# Patient Record
Sex: FEMALE | Race: WHITE | NOT HISPANIC OR LATINO | Employment: FULL TIME | URBAN - NONMETROPOLITAN AREA
[De-identification: names, ages, dates, MRNs, and addresses within clinical notes are randomized per-mention and may not be internally consistent; named-entity substitution may affect disease eponyms.]

---

## 2021-03-01 ENCOUNTER — HOSPITAL ENCOUNTER (EMERGENCY)
Facility: HOSPITAL | Age: 56
Discharge: HOME | End: 2021-03-01
Attending: EMERGENCY MEDICINE | Admitting: EMERGENCY MEDICINE
Payer: COMMERCIAL

## 2021-03-01 ENCOUNTER — APPOINTMENT (EMERGENCY)
Dept: CT IMAGING | Facility: HOSPITAL | Age: 56
End: 2021-03-01
Payer: COMMERCIAL

## 2021-03-01 VITALS
WEIGHT: 209.44 LBS | DIASTOLIC BLOOD PRESSURE: 70 MMHG | TEMPERATURE: 98.8 F | HEIGHT: 64 IN | BODY MASS INDEX: 35.76 KG/M2 | SYSTOLIC BLOOD PRESSURE: 131 MMHG | OXYGEN SATURATION: 97 % | HEART RATE: 79 BPM | RESPIRATION RATE: 16 BRPM

## 2021-03-01 DIAGNOSIS — N20.1 RIGHT URETERAL STONE: Primary | ICD-10-CM

## 2021-03-01 DIAGNOSIS — M54.31 SCIATICA OF RIGHT SIDE: ICD-10-CM

## 2021-03-01 LAB
ANION GAP SERPL CALCULATED.3IONS-SCNC: 6 MMOL/L (ref 4–13)
BACTERIA UR QL AUTO: ABNORMAL /HPF
BASOPHILS # BLD AUTO: 0.05 THOUSANDS/ΜL (ref 0–0.1)
BASOPHILS NFR BLD AUTO: 1 % (ref 0–1)
BILIRUB UR QL STRIP: NEGATIVE
BUN SERPL-MCNC: 9 MG/DL (ref 5–25)
CALCIUM SERPL-MCNC: 8.7 MG/DL (ref 8.3–10.1)
CHLORIDE SERPL-SCNC: 103 MMOL/L (ref 100–108)
CLARITY UR: CLEAR
CO2 SERPL-SCNC: 30 MMOL/L (ref 21–32)
COLOR UR: YELLOW
CREAT SERPL-MCNC: 0.8 MG/DL (ref 0.6–1.3)
EOSINOPHIL # BLD AUTO: 0.17 THOUSAND/ΜL (ref 0–0.61)
EOSINOPHIL NFR BLD AUTO: 2 % (ref 0–6)
ERYTHROCYTE [DISTWIDTH] IN BLOOD BY AUTOMATED COUNT: 15.4 % (ref 11.6–15.1)
GFR SERPL CREATININE-BSD FRML MDRD: 83 ML/MIN/1.73SQ M
GLUCOSE SERPL-MCNC: 124 MG/DL (ref 65–140)
GLUCOSE UR STRIP-MCNC: NEGATIVE MG/DL
HCT VFR BLD AUTO: 41 % (ref 34.8–46.1)
HGB BLD-MCNC: 13.4 G/DL (ref 11.5–15.4)
HGB UR QL STRIP.AUTO: ABNORMAL
IMM GRANULOCYTES # BLD AUTO: 0.03 THOUSAND/UL (ref 0–0.2)
IMM GRANULOCYTES NFR BLD AUTO: 0 % (ref 0–2)
KETONES UR STRIP-MCNC: NEGATIVE MG/DL
LEUKOCYTE ESTERASE UR QL STRIP: NEGATIVE
LYMPHOCYTES # BLD AUTO: 3.52 THOUSANDS/ΜL (ref 0.6–4.47)
LYMPHOCYTES NFR BLD AUTO: 39 % (ref 14–44)
MCH RBC QN AUTO: 27.7 PG (ref 26.8–34.3)
MCHC RBC AUTO-ENTMCNC: 32.7 G/DL (ref 31.4–37.4)
MCV RBC AUTO: 85 FL (ref 82–98)
MONOCYTES # BLD AUTO: 0.41 THOUSAND/ΜL (ref 0.17–1.22)
MONOCYTES NFR BLD AUTO: 5 % (ref 4–12)
NEUTROPHILS # BLD AUTO: 4.93 THOUSANDS/ΜL (ref 1.85–7.62)
NEUTS SEG NFR BLD AUTO: 53 % (ref 43–75)
NITRITE UR QL STRIP: NEGATIVE
NON-SQ EPI CELLS URNS QL MICRO: ABNORMAL /HPF
NRBC BLD AUTO-RTO: 0 /100 WBCS
PH UR STRIP.AUTO: 6.5 [PH]
PLATELET # BLD AUTO: 319 THOUSANDS/UL (ref 149–390)
PMV BLD AUTO: 10.1 FL (ref 8.9–12.7)
POTASSIUM SERPL-SCNC: 3.4 MMOL/L (ref 3.5–5.3)
PROT UR STRIP-MCNC: NEGATIVE MG/DL
RBC # BLD AUTO: 4.84 MILLION/UL (ref 3.81–5.12)
RBC #/AREA URNS AUTO: ABNORMAL /HPF
SODIUM SERPL-SCNC: 139 MMOL/L (ref 136–145)
SP GR UR STRIP.AUTO: 1.01 (ref 1–1.03)
UROBILINOGEN UR QL STRIP.AUTO: 0.2 E.U./DL
WBC # BLD AUTO: 9.11 THOUSAND/UL (ref 4.31–10.16)
WBC #/AREA URNS AUTO: ABNORMAL /HPF

## 2021-03-01 PROCEDURE — 96375 TX/PRO/DX INJ NEW DRUG ADDON: CPT

## 2021-03-01 PROCEDURE — 80048 BASIC METABOLIC PNL TOTAL CA: CPT | Performed by: EMERGENCY MEDICINE

## 2021-03-01 PROCEDURE — 85025 COMPLETE CBC W/AUTO DIFF WBC: CPT | Performed by: EMERGENCY MEDICINE

## 2021-03-01 PROCEDURE — 81001 URINALYSIS AUTO W/SCOPE: CPT | Performed by: EMERGENCY MEDICINE

## 2021-03-01 PROCEDURE — 36415 COLL VENOUS BLD VENIPUNCTURE: CPT | Performed by: EMERGENCY MEDICINE

## 2021-03-01 PROCEDURE — 96374 THER/PROPH/DIAG INJ IV PUSH: CPT

## 2021-03-01 PROCEDURE — 99284 EMERGENCY DEPT VISIT MOD MDM: CPT | Performed by: EMERGENCY MEDICINE

## 2021-03-01 PROCEDURE — 74176 CT ABD & PELVIS W/O CONTRAST: CPT

## 2021-03-01 PROCEDURE — 99284 EMERGENCY DEPT VISIT MOD MDM: CPT

## 2021-03-01 PROCEDURE — G1004 CDSM NDSC: HCPCS

## 2021-03-01 RX ORDER — OXYCODONE HYDROCHLORIDE AND ACETAMINOPHEN 5; 325 MG/1; MG/1
1 TABLET ORAL EVERY 4 HOURS PRN
Qty: 15 TABLET | Refills: 0 | Status: SHIPPED | OUTPATIENT
Start: 2021-03-01

## 2021-03-01 RX ORDER — NAPROXEN 500 MG/1
500 TABLET ORAL 2 TIMES DAILY WITH MEALS
Qty: 30 TABLET | Refills: 0 | Status: SHIPPED | OUTPATIENT
Start: 2021-03-01

## 2021-03-01 RX ORDER — ATORVASTATIN CALCIUM 20 MG/1
20 TABLET, FILM COATED ORAL DAILY
COMMUNITY

## 2021-03-01 RX ORDER — DIAZEPAM 5 MG/ML
2.5 INJECTION, SOLUTION INTRAMUSCULAR; INTRAVENOUS ONCE
Status: COMPLETED | OUTPATIENT
Start: 2021-03-01 | End: 2021-03-01

## 2021-03-01 RX ORDER — KETOROLAC TROMETHAMINE 30 MG/ML
15 INJECTION, SOLUTION INTRAMUSCULAR; INTRAVENOUS ONCE
Status: COMPLETED | OUTPATIENT
Start: 2021-03-01 | End: 2021-03-01

## 2021-03-01 RX ORDER — OMEPRAZOLE 20 MG/1
20 CAPSULE, DELAYED RELEASE ORAL DAILY
COMMUNITY
Start: 2021-01-06

## 2021-03-01 RX ORDER — TAMSULOSIN HYDROCHLORIDE 0.4 MG/1
0.4 CAPSULE ORAL
Qty: 10 CAPSULE | Refills: 0 | Status: SHIPPED | OUTPATIENT
Start: 2021-03-01 | End: 2021-03-11

## 2021-03-01 RX ADMIN — KETOROLAC TROMETHAMINE 15 MG: 30 INJECTION, SOLUTION INTRAMUSCULAR; INTRAVENOUS at 18:16

## 2021-03-01 RX ADMIN — DIAZEPAM 2.5 MG: 10 INJECTION, SOLUTION INTRAMUSCULAR; INTRAVENOUS at 18:16

## 2021-03-01 NOTE — ED PROVIDER NOTES
History  Chief Complaint   Patient presents with    Back Pain     pt c/o left sided lower back pain starting 3 days ago with pain radiating down right leg this morning w/frequent urination  denies injury/trauma/travel/sob/fevers/cough     Patient states she woke up this morning with right lower back pain that radiates down the right leg  No loss of bladder or bowel functions  Does complain of dysuria and frequency and some left flank pain  No fevers or chills  No trauma  No rash  No history kidney stones  Nothing taken prior to arrival for symptoms  States the right low back pain gets worse with movement  History provided by:  Patient   used: No    Back Pain  Location:  Lumbar spine  Quality:  Aching  Radiates to: Right leg  Pain severity:  Mild  Pain is:  Same all the time  Onset quality:  Sudden  Duration: Started this morning  Timing:  Constant  Progression:  Unchanged  Chronicity:  New  Context: not jumping from heights, not occupational injury and not recent injury    Relieved by:  Nothing  Worsened by: Movement and bending  Ineffective treatments:  None tried  Associated symptoms: no abdominal pain, no abdominal swelling, no chest pain, no dysuria, no fever, no headaches, no leg pain, no perianal numbness and no tingling        Prior to Admission Medications   Prescriptions Last Dose Informant Patient Reported? Taking?   atorvastatin (LIPITOR) 20 mg tablet  Self Yes Yes   Sig: Take 20 mg by mouth daily   omeprazole (PriLOSEC) 20 mg delayed release capsule   Yes Yes   Sig: Take 20 mg by mouth daily   sertraline (ZOLOFT) 50 mg tablet   Yes No   Sig: Take by mouth daily      Facility-Administered Medications: None       History reviewed  No pertinent past medical history  Past Surgical History:   Procedure Laterality Date    AUGMENTATION BREAST       SECTION      x 2    CHOLECYSTECTOMY      HYSTERECTOMY      MULTIPLE TOOTH EXTRACTIONS         History reviewed  No pertinent family history  I have reviewed and agree with the history as documented  E-Cigarette/Vaping    E-Cigarette Use Never User      E-Cigarette/Vaping Substances     Social History     Tobacco Use    Smoking status: Current Every Day Smoker     Packs/day: 1 00     Types: Cigarettes    Smokeless tobacco: Never Used   Substance Use Topics    Alcohol use: Not Currently    Drug use: Never       Review of Systems   Constitutional: Negative for chills and fever  HENT: Negative for ear pain, hearing loss, sore throat, trouble swallowing and voice change  Eyes: Negative for pain and discharge  Respiratory: Negative for cough, shortness of breath and wheezing  Cardiovascular: Negative for chest pain and palpitations  Gastrointestinal: Negative for abdominal pain, blood in stool, constipation, diarrhea, nausea and vomiting  Genitourinary: Positive for frequency and urgency  Negative for dysuria, flank pain and hematuria  Musculoskeletal: Positive for back pain  Negative for joint swelling, neck pain and neck stiffness  Skin: Negative for rash and wound  Neurological: Negative for dizziness, tingling, seizures, syncope, facial asymmetry and headaches  Psychiatric/Behavioral: Negative for hallucinations, self-injury and suicidal ideas  All other systems reviewed and are negative  Physical Exam  Physical Exam  Vitals signs and nursing note reviewed  Constitutional:       General: She is not in acute distress  Appearance: She is well-developed  HENT:      Head: Normocephalic and atraumatic  Right Ear: External ear normal       Left Ear: External ear normal    Eyes:      Conjunctiva/sclera: Conjunctivae normal       Pupils: Pupils are equal, round, and reactive to light  Neck:      Musculoskeletal: Normal range of motion and neck supple  Cardiovascular:      Rate and Rhythm: Normal rate and regular rhythm  Heart sounds: Normal heart sounds  No murmur  Pulmonary:      Effort: Pulmonary effort is normal       Breath sounds: Normal breath sounds  No wheezing or rales  Abdominal:      General: Bowel sounds are normal  There is no distension  Palpations: Abdomen is soft  Tenderness: There is no abdominal tenderness  There is no guarding or rebound  Musculoskeletal: Normal range of motion  General: No deformity  Skin:     General: Skin is warm and dry  Findings: No rash  Neurological:      General: No focal deficit present  Mental Status: She is alert and oriented to person, place, and time  Cranial Nerves: No cranial nerve deficit     Psychiatric:         Behavior: Behavior normal          Vital Signs  ED Triage Vitals [03/01/21 1754]   Temperature Pulse Respirations Blood Pressure SpO2   98 8 °F (37 1 °C) 90 18 (!) 211/91 98 %      Temp Source Heart Rate Source Patient Position - Orthostatic VS BP Location FiO2 (%)   Temporal Monitor Lying Right arm --      Pain Score       8           Vitals:    03/01/21 1754 03/01/21 1900   BP: (!) 211/91 131/70   Pulse: 90 79   Patient Position - Orthostatic VS: Lying Lying         Visual Acuity      ED Medications  Medications   ketorolac (TORADOL) injection 15 mg (15 mg Intravenous Given 3/1/21 1816)   diazepam (VALIUM) injection 2 5 mg (2 5 mg Intravenous Given 3/1/21 1816)       Diagnostic Studies  Results Reviewed     Procedure Component Value Units Date/Time    Urine Microscopic [[id number]]  (Abnormal) Collected: 03/01/21 1819    Lab Status: Final result Specimen: Urine, Clean Catch Updated: 03/01/21 1837     RBC, UA 10-20 /hpf      WBC, UA 0-5 /hpf      Epithelial Cells Occasional /hpf      Bacteria, UA Occasional /hpf     UA w Reflex to Microscopic w Reflex to Culture [[id number]]  (Abnormal) Collected: 03/01/21 1819    Lab Status: Final result Specimen: Urine, Clean Catch Updated: 03/01/21 1836     Color, UA Yellow     Clarity, UA Clear     Specific Gravity, UA 1 010     pH, UA 6 5 Leukocytes, UA Negative     Nitrite, UA Negative     Protein, UA Negative mg/dl      Glucose, UA Negative mg/dl      Ketones, UA Negative mg/dl      Urobilinogen, UA 0 2 E U /dl      Bilirubin, UA Negative     Blood, UA Moderate    Basic metabolic panel [[id number]]  (Abnormal) Collected: 03/01/21 1818    Lab Status: Final result Specimen: Blood from Arm, Left Updated: 03/01/21 1834     Sodium 139 mmol/L      Potassium 3 4 mmol/L      Chloride 103 mmol/L      CO2 30 mmol/L      ANION GAP 6 mmol/L      BUN 9 mg/dL      Creatinine 0 80 mg/dL      Glucose 124 mg/dL      Calcium 8 7 mg/dL      eGFR 83 ml/min/1 73sq m     Narrative:      [place] guidelines for Chronic Kidney Disease (CKD):     Stage 1 with normal or high GFR (GFR > 90 mL/min/1 73 square meters)    Stage 2 Mild CKD (GFR = 60-89 mL/min/1 73 square meters)    Stage 3A Moderate CKD (GFR = 45-59 mL/min/1 73 square meters)    Stage 3B Moderate CKD (GFR = 30-44 mL/min/1 73 square meters)    Stage 4 Severe CKD (GFR = 15-29 mL/min/1 73 square meters)    Stage 5 End Stage CKD (GFR <15 mL/min/1 73 square meters)  Note: GFR calculation is accurate only with a steady state creatinine    CBC and differential [[id number]]  (Abnormal) Collected: 03/01/21 1818    Lab Status: Final result Specimen: Blood from Arm, Left Updated: 03/01/21 1824     WBC 9 11 Thousand/uL      RBC 4 84 Million/uL      Hemoglobin 13 4 g/dL      Hematocrit 41 0 %      MCV 85 fL      MCH 27 7 pg      MCHC 32 7 g/dL      RDW 15 4 %      MPV 10 1 fL      Platelets 414 Thousands/uL      nRBC 0 /100 WBCs      Neutrophils Relative 53 %      Immat GRANS % 0 %      Lymphocytes Relative 39 %      Monocytes Relative 5 %      Eosinophils Relative 2 %      Basophils Relative 1 %      Neutrophils Absolute 4 93 Thousands/µL      Immature Grans Absolute 0 03 Thousand/uL      Lymphocytes Absolute 3 52 Thousands/µL      Monocytes Absolute 0 41 Thousand/µL      Eosinophils Absolute 0 17 Thousand/µL      Basophils Absolute 0 05 Thousands/µL                  CT recon only lumbar spine   Final Result by Nelson Molina MD (03/01 1902)      No fracture or traumatic subluxation  Workstation performed: [id number]         CT renal stone study abdomen pelvis wo contrast   Final Result by Nelson Molina MD (03/01 1900)      2 mm proximal right ureteral calculus located just distal to the right UPJ not currently causing right hydroureter or hydronephrosis No other ureteral calculi  The study was marked in [facility] for immediate notification  Workstation performed: [id number]                    Procedures  Procedures         ED Course  ED Course as of Mar 01 1916   Mon Mar 01, 2021   [address] Patient seen  Symptoms have improved  Neurologic exam is nonfocal                                 SBIRT 22yo+      Most Recent Value   SBIRT (22 yo +)   In order to provide better care to our patients, we are screening all of our patients for alcohol and drug use  Would it be okay to ask you these screening questions? Yes Filed at: 03/01/2021 1825   Initial Alcohol Screen: US AUDIT-C    1  How often do you have a drink containing alcohol?  0 Filed at: 03/01/2021 1825   2  How many drinks containing alcohol do you have on a typical day you are drinking? 0 Filed at: 03/01/2021 1825   3a  Male UNDER 65: How often do you have five or more drinks on one occasion? 0 Filed at: 03/01/2021 1825   3b  FEMALE Any Age, or MALE 65+: How often do you have 4 or more drinks on one occassion? 0 Filed at: 03/01/2021 1825   Audit-C Score  0 Filed at: 03/01/2021 1825   SHERRI: How many times in the past year have you    Used an illegal drug or used a prescription medication for non-medical reasons? Never Filed at: 03/01/2021 1825                    MDM  Number of Diagnoses or Management Options  Diagnosis management comments: Patient with a 2 mm right proximal stone    Will try outpatient treatment  Talked with family and patient were agreement with plan of care  Disposition  Final diagnoses:   Right ureteral stone   Sciatica of right side     Time reflects when diagnosis was documented in both MDM as applicable and the Disposition within this note     Time User Action Codes Description Comment    3/1/2021  6:17 PM Tania [place] Add [M54 31] Sciatica of right side     3/1/2021  7:01 PM Vislindsey, Arjohn Doll Remove [G87 69] Sciatica of right side     3/1/2021  7:05 PM Visstarras Arrickla Doll Add [N20 1] Right ureteral stone     3/1/2021  7:05 PM Tania [place] Add [N26 82] Sciatica of right side       ED Disposition     ED Disposition Condition Date/Time Comment    Discharge Stable Mon Mar 1, 2021  7:05 PM Tiffani Joshiy discharge to home/self care              Follow-up Information     Follow up With Specialties Details Why Contact Info Additional [address], DO  Call in 2 days  [facility] [address] [place] [phone]       [address] [facility] and Pain [place] [facility] Call in 2 days  [address]  [facility] [phone]  [address] Pain [facility], [address]tr[address] A, [facility]    Annamarie Viramontes MD Sports Medicine Call in 2 days  [address]  [address] [facility],[address] [place] [address]       Ar Riley MD Urology   [address] [place] [address]  [phone]             Patient's Medications   Discharge Prescriptions    NAPROXEN (NAPROSYN) 500 MG TABLET    Take 1 tablet (500 mg total) by mouth 2 (two) times a day with meals       Start Date: 3/1/2021  End Date: --       Order Dose: 500 mg       Quantity: 30 tablet    Refills: 0    OXYCODONE-ACETAMINOPHEN (PERCOCET) 5-325 MG PER TABLET    Take 1 tablet by mouth every 4 (four) hours as needed for moderate pain for up to 15 dosesMax Daily Amount: 6 tablets       Start Date: 3/1/2021  End Date: --       Order Dose: 1 tablet       Quantity: 15 tablet    Refills: 0    TAMSULOSIN (FLOMAX) 0 4 MG    Take 1 capsule (0 4 mg total) by mouth daily with dinner for 10 days       Start Date: 3/1/2021  End Date: 3/11/2021       Order Dose: 0 4 mg       Quantity: 10 capsule    Refills: 0         PDMP Review     None          ED Provider  Electronically Signed by           Luz Maria Wagner MD  03/01/21 El Beatty MD  03/01/21 El Beatty MD  03/01/21 9447

## 2021-03-01 NOTE — LETTER
Eduardo Concepcion was seen and treated in our emergency department on 3/1/2021  Diagnosis:     Leslye Bryant  may return to work on return date  She may return on this date: 03/04/2021         If you have any questions or concerns, please don't hesitate to call        Miguelina Zamora MD    ______________________________           _______________          _______________  Hospital Representative                              Date                                Time

## 2021-03-18 ENCOUNTER — TELEPHONE (OUTPATIENT)
Dept: CARDIOLOGY CLINIC | Facility: CLINIC | Age: 56
End: 2021-03-18

## 2021-03-18 NOTE — TELEPHONE ENCOUNTER
This patient was one of two patients in the referral workqueue marked ASAP  Dr Aleksandra Meredith said I should send this to you  He asked if you could maybe double book somewhere the next time he is at [facility] which is 4/8/2021  Thank you  I will be sending another one also

## 2021-04-08 ENCOUNTER — OFFICE VISIT (OUTPATIENT)
Dept: UROLOGY | Facility: CLINIC | Age: 56
End: 2021-04-08
Payer: COMMERCIAL

## 2021-04-08 VITALS
HEART RATE: 84 BPM | HEIGHT: 64 IN | SYSTOLIC BLOOD PRESSURE: 120 MMHG | DIASTOLIC BLOOD PRESSURE: 70 MMHG | WEIGHT: 198 LBS | BODY MASS INDEX: 33.8 KG/M2

## 2021-04-08 DIAGNOSIS — N20.1 RIGHT URETERAL STONE: ICD-10-CM

## 2021-04-08 PROCEDURE — 99203 OFFICE O/P NEW LOW 30 MIN: CPT | Performed by: UROLOGY

## 2021-04-08 NOTE — LETTER
2021     Emily Goss MD  [address] [facility] [address]    Patient: Deloris Torres   YOB: 1965   Date of Visit: 2021       Dear Dr Mari Gutierrez: Thank you for referring Deloris Torres to me for evaluation  Below are my notes for this consultation  If you have questions, please do not hesitate to call me  I look forward to following your patient along with you  Sincerely,        Sherryle Alice, MD        CC: No Recipients  Sherryle Alice, MD  2021  9:46 AM  Sign when Signing Visit  [address] [facility]  [place] [phone]  www  [facility]  org      NAME: Deloris Torres  AGE: 54 y o  SEX: female  : 1965   MRN: [id number]    DATE: 2021  TIME: 9:42 AM    Assessment and Plan:    2 mm right UPJ stone, should pass on its own  She is still having occasional right flank pain but if this persists she will call me and we will get another scan  In the meantime I am confident that the stone will pass on its own so I do not think any further imaging is needed  With regards to her left flank pain, I see no renal or ureteral origin for this  This is most likely radiculopathy  She will follow up with me as needed  Discussed stone prevention such as drinking more fluids avoiding salt etc   With 1 solitary stone she does not need a metabolic workup  Chief Complaint   No chief complaint on file  History of Present Illness     New patient office visit:  44-year-old woman who was seen emergency department 3/1/2021 for right flank pain and was found have a 2 mm proximal right ureteral calculus just distal to the right UPJ causing no hydronephrosis  I have personally reviewed her CT scan and she has no other calculi  A very tiny stone can be seen right at the UPJ per my review  No other abnormalities  She has never had stones before    No previous urologic visits  It must be noted that she had sciatica on the right side during the time of her emergency room visit and could barely lift her leg  Currently she has left flank pain  She has occasional right flank pain  She says her urine smells bad and has an orange color occasionally  The following portions of the patient's history were reviewed and updated as appropriate: allergies, current medications, past family history, past medical history, past social history, past surgical history and problem list   History reviewed  No pertinent past medical history  Past Surgical History:   Procedure Laterality Date    AUGMENTATION BREAST       SECTION      x 2    CHOLECYSTECTOMY      HYSTERECTOMY      MULTIPLE TOOTH EXTRACTIONS       shoulder  Review of Systems   Review of Systems   Constitutional: Negative for fever  Respiratory: Negative for shortness of breath  Cardiovascular: Negative for chest pain  Genitourinary: Negative  Active Problem List   There is no problem list on file for this patient  Objective   /70   Pulse 84   Ht 5' 4" (1 626 m)   Wt 89 8 kg (198 lb)   BMI 33 99 kg/m²     Physical Exam  Constitutional:       Appearance: Normal appearance  HENT:      Head: Normocephalic and atraumatic  Eyes:      Extraocular Movements: Extraocular movements intact  Neck:      Musculoskeletal: Normal range of motion  Pulmonary:      Effort: Pulmonary effort is normal    Musculoskeletal: Normal range of motion  Skin:     Coloration: Skin is not jaundiced or pale  Neurological:      General: No focal deficit present  Mental Status: She is alert and oriented to person, place, and time  Psychiatric:         Mood and Affect: Mood normal          Behavior: Behavior normal          Thought Content:  Thought content normal          Judgment: Judgment normal              Current Medications     Current Outpatient Medications:     atorvastatin (LIPITOR) 20 mg tablet, Take 20 mg by mouth daily, Disp: , Rfl:     naproxen (NAPROSYN) 500 mg tablet, Take 1 tablet (500 mg total) by mouth 2 (two) times a day with meals, Disp: 30 tablet, Rfl: 0    omeprazole (PriLOSEC) 20 mg delayed release capsule, Take 20 mg by mouth daily, Disp: , Rfl:     sertraline (ZOLOFT) 50 mg tablet, Take 100 mg by mouth daily , Disp: , Rfl:     oxyCODONE-acetaminophen (PERCOCET) 5-325 mg per tablet, Take 1 tablet by mouth every 4 (four) hours as needed for moderate pain for up to 15 dosesMax Daily Amount: 6 tablets (Patient not taking: Reported on 4/8/2021), Disp: 15 tablet, Rfl: 0    tamsulosin (FLOMAX) 0 4 mg, Take 1 capsule (0 4 mg total) by mouth daily with dinner for 10 days (Patient not taking: Reported on 4/8/2021), Disp: 10 capsule, Rfl: 0        Mounika Bauer MD

## 2021-04-08 NOTE — PROGRESS NOTES
[address] [facility] for [facility]  [facility]  [address] [facility] [place]n  [place], [address] [place] [address]  [phone]  www  [facility]  org      NAME: Mikal Izaguirre  AGE: 54 y o  SEX: female  : 1965   MRN: [id number]    DATE: 2021  TIME: 9:42 AM    Assessment and Plan:    2 mm right UPJ stone, should pass on its own  She is still having occasional right flank pain but if this persists she will call me and we will get another scan  In the meantime I am confident that the stone will pass on its own so I do not think any further imaging is needed  With regards to her left flank pain, I see no renal or ureteral origin for this  This is most likely radiculopathy  She will follow up with me as needed  Discussed stone prevention such as drinking more fluids avoiding salt etc   With 1 solitary stone she does not need a metabolic workup  Chief Complaint   No chief complaint on file  History of Present Illness     New patient office visit:  72-year-old woman who was seen emergency department 3/1/2021 for right flank pain and was found have a 2 mm proximal right ureteral calculus just distal to the right UPJ causing no hydronephrosis  I have personally reviewed her CT scan and she has no other calculi  A very tiny stone can be seen right at the UPJ per my review  No other abnormalities  She has never had stones before  No previous urologic visits  It must be noted that she had sciatica on the right side during the time of her emergency room visit and could barely lift her leg  Currently she has left flank pain  She has occasional right flank pain  She says her urine smells bad and has an orange color occasionally        The following portions of the patient's history were reviewed and updated as appropriate: allergies, current medications, past family history, past medical history, past social history, past surgical history and problem list   History reviewed  No pertinent past medical history  Past Surgical History:   Procedure Laterality Date    AUGMENTATION BREAST       SECTION      x 2    CHOLECYSTECTOMY      HYSTERECTOMY      MULTIPLE TOOTH EXTRACTIONS       shoulder  Review of Systems   Review of Systems   Constitutional: Negative for fever  Respiratory: Negative for shortness of breath  Cardiovascular: Negative for chest pain  Genitourinary: Negative  Active Problem List   There is no problem list on file for this patient  Objective   /70   Pulse 84   Ht 5' 4" (1 626 m)   Wt 89 8 kg (198 lb)   BMI 33 99 kg/m²     Physical Exam  Constitutional:       Appearance: Normal appearance  HENT:      Head: Normocephalic and atraumatic  Eyes:      Extraocular Movements: Extraocular movements intact  Neck:      Musculoskeletal: Normal range of motion  Pulmonary:      Effort: Pulmonary effort is normal    Musculoskeletal: Normal range of motion  Skin:     Coloration: Skin is not jaundiced or pale  Neurological:      General: No focal deficit present  Mental Status: She is alert and oriented to person, place, and time  Psychiatric:         Mood and Affect: Mood normal          Behavior: Behavior normal          Thought Content:  Thought content normal          Judgment: Judgment normal              Current Medications     Current Outpatient Medications:     atorvastatin (LIPITOR) 20 mg tablet, Take 20 mg by mouth daily, Disp: , Rfl:     naproxen (NAPROSYN) 500 mg tablet, Take 1 tablet (500 mg total) by mouth 2 (two) times a day with meals, Disp: 30 tablet, Rfl: 0    omeprazole (PriLOSEC) 20 mg delayed release capsule, Take 20 mg by mouth daily, Disp: , Rfl:     sertraline (ZOLOFT) 50 mg tablet, Take 100 mg by mouth daily , Disp: , Rfl:     oxyCODONE-acetaminophen (PERCOCET) 5-325 mg per tablet, Take 1 tablet by mouth every 4 (four) hours as needed for moderate pain for up to 15 dosesMax Daily Amount: 6 tablets (Patient not taking: Reported on 4/8/2021), Disp: 15 tablet, Rfl: 0    tamsulosin (FLOMAX) 0 4 mg, Take 1 capsule (0 4 mg total) by mouth daily with dinner for 10 days (Patient not taking: Reported on 4/8/2021), Disp: 10 capsule, Rfl: 0        Citlaly Milton MD

## 2023-02-22 ENCOUNTER — HOSPITAL ENCOUNTER (INPATIENT)
Facility: HOSPITAL | Age: 58
LOS: 1 days | Discharge: ANOTHER HOSPITAL | End: 2023-02-23
Attending: EMERGENCY MEDICINE | Admitting: FAMILY MEDICINE

## 2023-02-22 ENCOUNTER — APPOINTMENT (EMERGENCY)
Dept: RADIOLOGY | Facility: HOSPITAL | Age: 58
End: 2023-02-22

## 2023-02-22 DIAGNOSIS — R07.9 CHEST PAIN: Primary | ICD-10-CM

## 2023-02-22 DIAGNOSIS — R07.9 CHEST PAIN, UNSPECIFIED TYPE: ICD-10-CM

## 2023-02-22 PROBLEM — F32.9 MDD (MAJOR DEPRESSIVE DISORDER): Status: ACTIVE | Noted: 2023-02-22

## 2023-02-22 LAB
2HR DELTA HS TROPONIN: 0 NG/L
4HR DELTA HS TROPONIN: 1 NG/L
ALBUMIN SERPL BCP-MCNC: 4.2 G/DL (ref 3.5–5)
ALP SERPL-CCNC: 88 U/L (ref 34–104)
ALT SERPL W P-5'-P-CCNC: 10 U/L (ref 7–52)
ANION GAP SERPL CALCULATED.3IONS-SCNC: 8 MMOL/L (ref 4–13)
AST SERPL W P-5'-P-CCNC: 16 U/L (ref 13–39)
ATRIAL RATE: 94 BPM
BASOPHILS # BLD AUTO: 0.05 THOUSANDS/ÂΜL (ref 0–0.1)
BASOPHILS NFR BLD AUTO: 1 % (ref 0–1)
BILIRUB SERPL-MCNC: 0.35 MG/DL (ref 0.2–1)
BUN SERPL-MCNC: 8 MG/DL (ref 5–25)
CALCIUM SERPL-MCNC: 9.2 MG/DL (ref 8.4–10.2)
CARDIAC TROPONIN I PNL SERPL HS: 3 NG/L
CARDIAC TROPONIN I PNL SERPL HS: 3 NG/L
CARDIAC TROPONIN I PNL SERPL HS: 4 NG/L
CHLORIDE SERPL-SCNC: 105 MMOL/L (ref 96–108)
CO2 SERPL-SCNC: 27 MMOL/L (ref 21–32)
CREAT SERPL-MCNC: 0.76 MG/DL (ref 0.6–1.3)
EOSINOPHIL # BLD AUTO: 0.22 THOUSAND/ÂΜL (ref 0–0.61)
EOSINOPHIL NFR BLD AUTO: 3 % (ref 0–6)
ERYTHROCYTE [DISTWIDTH] IN BLOOD BY AUTOMATED COUNT: 15.9 % (ref 11.6–15.1)
FLUAV RNA RESP QL NAA+PROBE: NEGATIVE
FLUBV RNA RESP QL NAA+PROBE: NEGATIVE
GFR SERPL CREATININE-BSD FRML MDRD: 87 ML/MIN/1.73SQ M
GLUCOSE SERPL-MCNC: 134 MG/DL (ref 65–140)
HCT VFR BLD AUTO: 43 % (ref 34.8–46.1)
HGB BLD-MCNC: 13.5 G/DL (ref 11.5–15.4)
IMM GRANULOCYTES # BLD AUTO: 0.04 THOUSAND/UL (ref 0–0.2)
IMM GRANULOCYTES NFR BLD AUTO: 1 % (ref 0–2)
LYMPHOCYTES # BLD AUTO: 2.86 THOUSANDS/ÂΜL (ref 0.6–4.47)
LYMPHOCYTES NFR BLD AUTO: 33 % (ref 14–44)
MCH RBC QN AUTO: 26.6 PG (ref 26.8–34.3)
MCHC RBC AUTO-ENTMCNC: 31.4 G/DL (ref 31.4–37.4)
MCV RBC AUTO: 85 FL (ref 82–98)
MONOCYTES # BLD AUTO: 0.52 THOUSAND/ÂΜL (ref 0.17–1.22)
MONOCYTES NFR BLD AUTO: 6 % (ref 4–12)
NEUTROPHILS # BLD AUTO: 4.94 THOUSANDS/ÂΜL (ref 1.85–7.62)
NEUTS SEG NFR BLD AUTO: 56 % (ref 43–75)
NRBC BLD AUTO-RTO: 0 /100 WBCS
P AXIS: 69 DEGREES
PLATELET # BLD AUTO: 293 THOUSANDS/UL (ref 149–390)
PMV BLD AUTO: 10.5 FL (ref 8.9–12.7)
POTASSIUM SERPL-SCNC: 3.7 MMOL/L (ref 3.5–5.3)
PR INTERVAL: 132 MS
PROT SERPL-MCNC: 7.3 G/DL (ref 6.4–8.4)
QRS AXIS: 75 DEGREES
QRSD INTERVAL: 96 MS
QT INTERVAL: 364 MS
QTC INTERVAL: 455 MS
RBC # BLD AUTO: 5.07 MILLION/UL (ref 3.81–5.12)
RSV RNA RESP QL NAA+PROBE: NEGATIVE
SARS-COV-2 RNA RESP QL NAA+PROBE: NEGATIVE
SODIUM SERPL-SCNC: 140 MMOL/L (ref 135–147)
T WAVE AXIS: 39 DEGREES
VENTRICULAR RATE: 94 BPM
WBC # BLD AUTO: 8.63 THOUSAND/UL (ref 4.31–10.16)

## 2023-02-22 RX ORDER — ASPIRIN 81 MG/1
81 TABLET, CHEWABLE ORAL DAILY
Status: DISCONTINUED | OUTPATIENT
Start: 2023-02-23 | End: 2023-02-23 | Stop reason: HOSPADM

## 2023-02-22 RX ORDER — ATORVASTATIN CALCIUM 20 MG/1
20 TABLET, FILM COATED ORAL
Status: DISCONTINUED | OUTPATIENT
Start: 2023-02-22 | End: 2023-02-23

## 2023-02-22 RX ORDER — SERTRALINE HYDROCHLORIDE 100 MG/1
100 TABLET, FILM COATED ORAL DAILY
Status: DISCONTINUED | OUTPATIENT
Start: 2023-02-22 | End: 2023-02-23 | Stop reason: HOSPADM

## 2023-02-22 RX ORDER — NITROGLYCERIN 0.4 MG/1
0.4 TABLET SUBLINGUAL
Status: DISCONTINUED | OUTPATIENT
Start: 2023-02-22 | End: 2023-02-23 | Stop reason: HOSPADM

## 2023-02-22 RX ORDER — ASPIRIN 81 MG/1
324 TABLET, CHEWABLE ORAL ONCE
Status: COMPLETED | OUTPATIENT
Start: 2023-02-22 | End: 2023-02-22

## 2023-02-22 RX ORDER — ACETAMINOPHEN 325 MG/1
650 TABLET ORAL EVERY 6 HOURS PRN
Status: DISCONTINUED | OUTPATIENT
Start: 2023-02-22 | End: 2023-02-23 | Stop reason: HOSPADM

## 2023-02-22 RX ADMIN — SERTRALINE 100 MG: 100 TABLET, FILM COATED ORAL at 14:12

## 2023-02-22 RX ADMIN — NITROGLYCERIN 0.4 MG: 0.4 TABLET SUBLINGUAL at 14:40

## 2023-02-22 RX ADMIN — NITROGLYCERIN 0.4 MG: 0.4 TABLET SUBLINGUAL at 09:11

## 2023-02-22 RX ADMIN — ATORVASTATIN CALCIUM 20 MG: 20 TABLET, FILM COATED ORAL at 16:21

## 2023-02-22 RX ADMIN — NITROGLYCERIN 0.4 MG: 0.4 TABLET SUBLINGUAL at 09:03

## 2023-02-22 RX ADMIN — ASPIRIN 81 MG 243 MG: 81 TABLET ORAL at 09:10

## 2023-02-22 RX ADMIN — NITROGLYCERIN 0.4 MG: 0.4 TABLET SUBLINGUAL at 09:21

## 2023-02-22 NOTE — H&P
[address]  1965, 62 y o  female MRN: [id number]  Unit/Bed#: -01 Encounter: [id number]  Primary Care Provider: Chato Martin DO   Date and time admitted to hospital: 2/22/2023  8:37 AM    * Chest pain  Assessment & Plan  · PHYLLIS 2  · Risk factors  · EKG nonspecific changes  · Troponin at 0 hours negative we will trend 2 more  · cardiology evaluated plan for stress test tomorrow treadmill nuclear  · Lipid panel  · Now cp free  · Tele  · As and lipitor started by cards  · cxr nml    MDD (major depressive disorder)  Assessment & Plan  · Restart zoloft    VTE Pharmacologic Prophylaxis: VTE Score: 1 Low Risk (Score 0-2) - Encourage Ambulation  Code Status: Level 1 - Full Code   Discussion with family: Updated  ( and daughter) at bedside  Anticipated Length of Stay: Patient will be admitted on an inpatient basis with an anticipated length of stay of greater than 2 midnights secondary to cp  Total Time Spent on Date of Encounter in care of patient: 65 minutes This time was spent on one or more of the following: performing physical exam; counseling and coordination of care; obtaining or reviewing history; documenting in the medical record; reviewing/ordering tests, medications or procedures; communicating with other healthcare professionals and discussing with patient's family/caregivers  Chief Complaint: Chest pain    History of Present Illness:  Rufino Woo is a 62 y o  female with a PMH of depression who presents with waxing and waning chest pain for about 3 days she stated started when she was walking around the house it actually went away after nitroglycerin given in the ER  Mild diaphoresis and shortness of breath and mild nausea the chest pain was described as pressure, risk factors for cholesterol elevation and smoking there is also family history of heart disease    Currently she is laying and eating and denies any shortness of breath there was some nonproductive cough for couple of days    Review of Systems:  Review of Systems   Constitutional: Positive for diaphoresis  Negative for chills and fever  HENT: Negative for ear pain and sore throat  Eyes: Negative for pain and visual disturbance  Respiratory: Positive for cough and shortness of breath  Cardiovascular: Positive for chest pain  Negative for palpitations  Gastrointestinal: Negative for abdominal pain and vomiting  Genitourinary: Negative for dysuria and hematuria  Musculoskeletal: Negative for arthralgias and back pain  Skin: Negative for color change and rash  Neurological: Negative for seizures and syncope  All other systems reviewed and are negative  Past Medical and Surgical History:   Past Medical History:   Diagnosis Date   • Hyperlipidemia        Past Surgical History:   Procedure Laterality Date   • AUGMENTATION BREAST     •  SECTION      x 2   • CHOLECYSTECTOMY     • HYSTERECTOMY     • MULTIPLE TOOTH EXTRACTIONS         Meds/Allergies:  Prior to Admission medications    Medication Sig Start Date End Date Taking?  Authorizing Provider   sertraline (ZOLOFT) 50 mg tablet Take 100 mg by mouth daily    Yes Historical Provider, MD   atorvastatin (LIPITOR) 20 mg tablet Take 20 mg by mouth daily  Patient not taking: Reported on 2023    Historical Provider, MD   omeprazole (PriLOSEC) 20 mg delayed release capsule Take 20 mg by mouth daily  Patient not taking: Reported on 2023   Historical Provider, MD   naproxen (NAPROSYN) 500 mg tablet Take 1 tablet (500 mg total) by mouth 2 (two) times a day with meals 3/1/21 2/22/23  Paradise Brady MD   oxyCODONE-acetaminophen (PERCOCET) 5-325 mg per tablet Take 1 tablet by mouth every 4 (four) hours as needed for moderate pain for up to 15 dosesMax Daily Amount: 6 tablets  Patient not taking: Reported on 2021 3/1/21 2/22/23  Paradise Brady MD   tamsulosin (FLOMAX) 0 4 mg Take 1 capsule (0 4 mg total) by mouth daily with dinner for 10 days  Patient not taking: Reported on 4/8/2021 3/1/21 2/22/23  Trina Romero MD     I have reviewed home medications with a medical source (PCP, Pharmacy, other)  Allergies: Allergies   Allergen Reactions   • Doxycycline Vomiting   • Cephalexin Other (See Comments) and Rash     Hot flashes and rash     • Morphine Rash and Vomiting   • Penicillins Rash       Social History:  Marital Status: /Civil Union   Substance Use History:   Social History     Substance and Sexual Activity   Alcohol Use Not Currently     Social History     Tobacco Use   Smoking Status Every Day   • Packs/day: 1 00   • Types: Cigarettes   Smokeless Tobacco Never     Social History     Substance and Sexual Activity   Drug Use Never       Family History:  Family History   Problem Relation Age of Onset   • Heart attack Father    • Stroke Mother    • Breast cancer Sister    • Diabetes Sister    • Diabetes Brother    • Cancer Brother        Physical Exam:     Vitals:   Blood Pressure: 126/71 (02/22/23 1126)  Pulse: 75 (02/22/23 1126)  Temperature: 98 1 °F (36 7 °C) (02/22/23 1126)  Temp Source: Temporal (02/22/23 1126)  Respirations: 18 (02/22/23 1126)  Height: 5' 4" (162 6 cm) (02/22/23 0843)  Weight - Scale: 89 5 kg (197 lb 5 oz) (02/22/23 0843)  SpO2: 95 % (02/22/23 1126)    Physical Exam  Vitals and nursing note reviewed  Constitutional:       General: She is not in acute distress  Appearance: She is well-developed  She is obese  HENT:      Head: Normocephalic and atraumatic  Eyes:      Conjunctiva/sclera: Conjunctivae normal    Cardiovascular:      Rate and Rhythm: Normal rate and regular rhythm  Heart sounds: No murmur heard  Pulmonary:      Effort: Pulmonary effort is normal  No respiratory distress  Breath sounds: Normal breath sounds  No wheezing or rales  Chest:      Chest wall: No tenderness     Abdominal:      General: There is no distension  Palpations: Abdomen is soft  Tenderness: There is no abdominal tenderness  Musculoskeletal:         General: No swelling  Cervical back: Neck supple  Skin:     General: Skin is warm and dry  Capillary Refill: Capillary refill takes less than 2 seconds  Neurological:      Mental Status: She is alert and oriented to person, place, and time  Psychiatric:         Mood and Affect: Mood normal          Additional Data:     Lab Results:  Results from last 7 days   Lab Units 02/22/23  0905   WBC Thousand/uL 8 63   HEMOGLOBIN g/dL 13 5   HEMATOCRIT % 43 0   PLATELETS Thousands/uL 293   NEUTROS PCT % 56   LYMPHS PCT % 33   MONOS PCT % 6   EOS PCT % 3     Results from last 7 days   Lab Units 02/22/23  0905   SODIUM mmol/L 140   POTASSIUM mmol/L 3 7   CHLORIDE mmol/L 105   CO2 mmol/L 27   BUN mg/dL 8   CREATININE mg/dL 0 76   ANION GAP mmol/L 8   CALCIUM mg/dL 9 2   ALBUMIN g/dL 4 2   TOTAL BILIRUBIN mg/dL 0 35   ALK PHOS U/L 88   ALT U/L 10   AST U/L 16   GLUCOSE RANDOM mg/dL 134                       Lines/Drains:  Invasive Devices     Peripheral Intravenous Line  Duration           Peripheral IV 02/22/23 Right Hand <1 day                    Imaging: Reviewed radiology reports from this admission including: chest xray  XR chest 1 view portable   Final Result by Marcelo Foley MD (02/22 3722)      No acute cardiopulmonary disease  Workstation performed: [id number]             EKG and Other Studies Reviewed on Admission:   · EKG: NSR  HR 94     ** Please Note: This note has been constructed using a voice recognition system   **

## 2023-02-22 NOTE — PROGRESS NOTES
Patient received nitro x1, chest pain #3 continued in L shoulder radiating to back  Dr Natalya Duran made aware and patient does not need further nitro treatment at this time  Patients BP prior to nitro was 162/74 and post nitro 125/67

## 2023-02-22 NOTE — CONSULTS
Consultation - Cardiology   Shahana German 62 y o  female MRN: [id number]  Unit/Bed#: ED 01 Encounter: [id number]    Assessment/Plan     Assessment:  Chest pain- constant over the last three days that is sometimes worse with exertion and also with rest   - ekg non specific changes   - troponin negative x 1  HLD with significantly elevated trig  Smoking  Fhx of CAD     Plan:  Continue asa 81 mg daily  Continue home dose of lipitor 20 mg daily   Monitor on telemetry  Trend troponins  Exercise nuc tomorrow for further ischemic evaluation given EKG changes     History of Present Illness   Physician Requesting Consult: Sagrario Way MD  Reason for Consult / Principal Problem: chest pain  HPI: Shahana German is a 62y o  year old female with history of HLD is here for concerns with chest pain  She states this has been ongoing for about 3 days  She states it started when she was walking around her house but is constant  She states it is waxing and waning throughout the day but has never completely gone away  At its worse, her pain is a 5/10  She states it is not associated with positional changes  She states when she is exerting herself she has to stop to catch her breath and then gets better  She did develop a cough a few days ago as well  She is a smoker 3/4 pack a day for 40 years  She does have a history of HLD  She reports her father had a massive heart attack when he was 61  Consults    Review of Systems   Constitutional: Negative for chills, fatigue and unexpected weight change  Respiratory: Positive for cough, chest tightness and shortness of breath  Cardiovascular: Positive for chest pain  Negative for palpitations and leg swelling  Gastrointestinal: Negative for nausea  Musculoskeletal: Positive for myalgias  Skin: Negative for color change, pallor and rash  Neurological: Negative for dizziness, weakness and light-headedness     Psychiatric/Behavioral: Negative for agitation, behavioral problems and confusion  Historical Information   History reviewed  No pertinent past medical history  Past Surgical History:   Procedure Laterality Date   • AUGMENTATION BREAST     •  SECTION      x 2   • CHOLECYSTECTOMY     • HYSTERECTOMY     • MULTIPLE TOOTH EXTRACTIONS       Social History     Substance and Sexual Activity   Alcohol Use Not Currently     Social History     Substance and Sexual Activity   Drug Use Never     E-Cigarette/Vaping   • E-Cigarette Use Never User      E-Cigarette/Vaping Substances     Social History     Tobacco Use   Smoking Status Every Day   • Packs/day: 1 00   • Types: Cigarettes   Smokeless Tobacco Never     Family History: non-contributory    Meds/Allergies   all current active meds have been reviewed  Allergies   Allergen Reactions   • Doxycycline Vomiting   • Cephalexin Other (See Comments) and Rash     Hot flashes and rash     • Morphine Rash and Vomiting   • Penicillins Rash       Objective   Vitals: Blood pressure 112/58, pulse 77, temperature 97 7 °F (36 5 °C), temperature source Temporal, resp  rate 21, height 5' 4" (1 626 m), weight 89 5 kg (197 lb 5 oz), SpO2 94 %  Orthostatic Blood Pressures    Flowsheet Row Most Recent Value   Blood Pressure 112/58 filed at 2023 1000   Patient Position - Orthostatic VS Lying filed at 2023 0843          No intake or output data in the 24 hours ending 23 1038    Invasive Devices     Peripheral Intravenous Line  Duration           Peripheral IV 23 Right Hand <1 day                Physical Exam  Vitals and nursing note reviewed  Constitutional:       Appearance: Normal appearance  HENT:      Head: Normocephalic and atraumatic  Cardiovascular:      Rate and Rhythm: Normal rate  Heart sounds: No murmur heard  No gallop  Pulmonary:      Effort: Pulmonary effort is normal       Breath sounds: No wheezing  Abdominal:      Palpations: Abdomen is soft     Musculoskeletal: General: No swelling  Cervical back: Neck supple  Skin:     General: Skin is warm  Capillary Refill: Capillary refill takes less than 2 seconds  Neurological:      General: No focal deficit present  Mental Status: She is alert and oriented to person, place, and time  Psychiatric:         Mood and Affect: Mood normal          Thought Content: Thought content normal          Lab Results:   I have personally reviewed pertinent lab results  CBC with diff:   Results from last 7 days   Lab Units 02/22/23  0905   WBC Thousand/uL 8 63   RBC Million/uL 5 07   HEMOGLOBIN g/dL 13 5   HEMATOCRIT % 43 0   MCV fL 85   MCH pg 26 6*   MCHC g/dL 31 4   RDW % 15 9*   MPV fL 10 5   PLATELETS Thousands/uL 293     CMP:   Results from last 7 days   Lab Units 02/22/23  0905   SODIUM mmol/L 140   CHLORIDE mmol/L 105   CO2 mmol/L 27   BUN mg/dL 8   CREATININE mg/dL 0 76   CALCIUM mg/dL 9 2   AST U/L 16   ALT U/L 10   ALK PHOS U/L 88   EGFR ml/min/1 73sq m 87     HS Troponin:   0   Lab Value Date/Time    HSTNI0 3 02/22/2023 0905     BNP:   Results from last 7 days   Lab Units 02/22/23  0905   POTASSIUM mmol/L 3 7   CHLORIDE mmol/L 105   CO2 mmol/L 27   BUN mg/dL 8   CREATININE mg/dL 0 76   CALCIUM mg/dL 9 2   EGFR ml/min/1 73sq m 87     Coags:     TSH:     Magnesium:     Lipid Profile:     Imaging: I have personally reviewed pertinent reports      EKG: Sinus rhythm with occasional Premature ventricular complexes  Nonspecific ST abnormality  Abnormal ECG  No previous ECGs available  Confirmed by Qi Oh ([id number]) on 2/22/2023 9:29:54 AM

## 2023-02-22 NOTE — ED PROVIDER NOTES
History  Chief Complaint   Patient presents with   • Chest Pain     Pt c/o upper left chest pain radiating to neck and left shoulder w/weakness, diarrhea and sob upon ambulating for past 3 days  Started with cough today  Pt took 81mg asa this morning  Denies travel/fevers/n/v     42-year-old female to the emergency department with chief complaint of intermittent chest pressure the last 3 days lasting for episodes of about 30 minutes each  Patient reports that it comes on with activity and gets relieved by rest   Associated with some mild diaphoresis and some shortness of breath and some mild nausea  Discomfort feels like a pressure and radiates into her neck and around her left shoulder and into her left arm      History provided by:  Relative and patient (Daughter provides portions of the history)  Chest Pain  Pain location:  Substernal area  Pain quality: pressure    Pain radiates to:  Neck, L shoulder and L arm  Pain radiates to the back: no    Pain severity:  Moderate  Onset quality:  Gradual  Duration:  3 days  Timing:  Intermittent  Progression:  Waxing and waning  Chronicity:  New  Relieved by:  Rest  Worsened by:  Nothing tried  Ineffective treatments:  None tried  Associated symptoms: cough, diaphoresis, nausea and shortness of breath    Associated symptoms: no abdominal pain, no back pain, no dizziness, no fatigue, no fever, no headache, no palpitations, not vomiting and no weakness    Risk factors: high cholesterol and smoking    Risk factors: no coronary artery disease, no diabetes mellitus and no hypertension    Risk factors comment:  Family hx of CAD (father)      Prior to Admission Medications   Prescriptions Last Dose Informant Patient Reported?  Taking?   atorvastatin (LIPITOR) 20 mg tablet   Yes Yes   Sig: Take 20 mg by mouth daily   omeprazole (PriLOSEC) 20 mg delayed release capsule 2/21/2023  Yes Yes   Sig: Take 20 mg by mouth daily   sertraline (ZOLOFT) 50 mg tablet 2/21/2023  Yes Yes   Sig: Take 100 mg by mouth daily       Facility-Administered Medications: None       History reviewed  No pertinent past medical history  Past Surgical History:   Procedure Laterality Date   • AUGMENTATION BREAST     •  SECTION      x 2   • CHOLECYSTECTOMY     • HYSTERECTOMY     • MULTIPLE TOOTH EXTRACTIONS         Family History   Problem Relation Age of Onset   • Heart attack Father    • Stroke Mother    • Breast cancer Sister    • Diabetes Sister    • Diabetes Brother    • Cancer Brother      I have reviewed and agree with the history as documented  E-Cigarette/Vaping   • E-Cigarette Use Never User      E-Cigarette/Vaping Substances     Social History     Tobacco Use   • Smoking status: Every Day     Packs/day:  00     Types: Cigarettes   • Smokeless tobacco: Never   Vaping Use   • Vaping Use: Never used   Substance Use Topics   • Alcohol use: Not Currently   • Drug use: Never       Review of Systems   Constitutional: Positive for diaphoresis  Negative for activity change, fatigue and fever  HENT: Negative  Negative for congestion, ear pain, rhinorrhea, sinus pressure and sore throat  Eyes: Negative  Respiratory: Positive for cough and shortness of breath  Negative for chest tightness and wheezing  Cardiovascular: Positive for chest pain  Negative for palpitations and leg swelling  Gastrointestinal: Positive for nausea  Negative for abdominal pain, diarrhea and vomiting  Endocrine: Negative  Genitourinary: Negative  Negative for dysuria, flank pain and frequency  Musculoskeletal: Positive for myalgias  Negative for arthralgias and back pain  Skin: Negative  Negative for rash  Allergic/Immunologic: Negative  Neurological: Negative for dizziness, weakness, light-headedness and headaches  Hematological: Negative  Psychiatric/Behavioral: Negative  All other systems reviewed and are negative  Physical Exam  Physical Exam  Vitals and nursing note reviewed  Constitutional:       General: She is awake  She is in acute distress  Appearance: Normal appearance  She is well-developed  She is obese  She is not ill-appearing or toxic-appearing  HENT:      Head: Normocephalic and atraumatic  Right Ear: External ear normal       Left Ear: External ear normal       Nose: Nose normal       Mouth/Throat:      Mouth: Mucous membranes are moist    Eyes:      General: Lids are normal  No scleral icterus  Extraocular Movements: Extraocular movements intact  Pupils: Pupils are equal, round, and reactive to light  Cardiovascular:      Rate and Rhythm: Normal rate and regular rhythm  Frequent extrasystoles are present  Heart sounds: Normal heart sounds  No murmur heard  Pulmonary:      Effort: Pulmonary effort is normal  No respiratory distress  Breath sounds: Normal breath sounds  No wheezing, rhonchi or rales  Abdominal:      General: Abdomen is flat  There is no distension  Palpations: Abdomen is soft  Tenderness: There is no abdominal tenderness  There is no guarding or rebound  Musculoskeletal:         General: No swelling, tenderness or deformity  Normal range of motion  Cervical back: Normal range of motion and neck supple  Skin:     General: Skin is warm and dry  Coloration: Skin is not jaundiced or pale  Findings: No rash  Neurological:      Mental Status: She is alert and oriented to person, place, and time  Mental status is at baseline  Cranial Nerves: No cranial nerve deficit  Motor: No weakness     Psychiatric:         Attention and Perception: Attention normal          Mood and Affect: Mood normal          Speech: Speech normal          Behavior: Behavior normal          Vital Signs  ED Triage Vitals [02/22/23 0843]   Temperature Pulse Respirations Blood Pressure SpO2   97 7 °F (36 5 °C) 87 18 (!) 179/84 99 %      Temp Source Heart Rate Source Patient Position - Orthostatic VS BP Location FiO2 (%) Temporal Monitor Lying Left arm --      Pain Score       7           Vitals:    02/22/23 0912 02/22/23 0915 02/22/23 0922 02/22/23 1000   BP: 129/61 119/58 119/58 112/58   Pulse: 92 97 92 77   Patient Position - Orthostatic VS:             Visual Acuity      ED Medications  Medications   nitroglycerin (NITROSTAT) SL tablet 0 4 mg (0 4 mg Sublingual Given 2/22/23 9600)   aspirin chewable tablet 324 mg (243 mg Oral Given 2/22/23 0910)       Diagnostic Studies  Results Reviewed     Procedure Component Value Units Date/Time    FLU/RSV/COVID - if FLU/RSV clinically relevant [[id number]]  (Normal) Collected: 02/22/23 0905    Lab Status: Final result Specimen: Nares from Nose Updated: 02/22/23 0950     SARS-CoV-2 Negative     INFLUENZA A PCR Negative     INFLUENZA B PCR Negative     RSV PCR Negative    Narrative:      FOR PEDIATRIC PATIENTS - copy/paste COVID Guidelines URL to browser: [email or web address]  [email or web address]    SARS-CoV-2 assay is a Nucleic Acid Amplification assay intended for the  qualitative detection of nucleic acid from SARS-CoV-2 in nasopharyngeal  swabs  Results are for the presumptive identification of SARS-CoV-2 RNA  Positive results are indicative of infection with SARS-CoV-2, the virus  causing COVID-19, but do not rule out bacterial infection or co-infection  with other viruses  Laboratories within the [place] and its  territories are required to report all positive results to the appropriate  public health authorities  Negative results do not preclude SARS-CoV-2  infection and should not be used as the sole basis for treatment or other  patient management decisions  Negative results must be combined with  clinical observations, patient history, and epidemiological information  This test has not been FDA cleared or approved  This test has been authorized by FDA under an Emergency Use Authorization  (EUA)   This test is only authorized for the duration of time the  declaration that circumstances exist justifying the authorization of the  emergency use of an in vitro diagnostic tests for detection of SARS-CoV-2  virus and/or diagnosis of COVID-19 infection under section 564(b)(1) of  the Act, 21 U  S C  012WXX-5(M)(0), unless the authorization is terminated  or revoked sooner  The test has been validated but independent review by FDA  and CLIA is pending  Test performed using [employer] Gene[employer]pert: This RT-PCR assay targets N2,  a region unique to SARS-CoV-2  A conserved region in the E-gene was chosen  for pan-Sarbecovirus detection which includes SARS-CoV-2  According to CMS-2020-01-R, this platform meets the definition of high-throughput technology      Comprehensive metabolic panel [[id number]] Collected: 02/22/23 0905    Lab Status: Final result Specimen: Blood from Line, Venous Updated: 02/22/23 0946     Sodium 140 mmol/L      Potassium 3 7 mmol/L      Chloride 105 mmol/L      CO2 27 mmol/L      ANION GAP 8 mmol/L      BUN 8 mg/dL      Creatinine 0 76 mg/dL      Glucose 134 mg/dL      Calcium 9 2 mg/dL      AST 16 U/L      ALT 10 U/L      Alkaline Phosphatase 88 U/L      Total Protein 7 3 g/dL      Albumin 4 2 g/dL      Total Bilirubin 0 35 mg/dL      eGFR 87 ml/min/1 73sq m     Narrative:      [facility]uthie guidelines for Chronic Kidney Disease (CKD):   •  Stage 1 with normal or high GFR (GFR > 90 mL/min/1 73 square meters)  •  Stage 2 Mild CKD (GFR = 60-89 mL/min/1 73 square meters)  •  Stage 3A Moderate CKD (GFR = 45-59 mL/min/1 73 square meters)  •  Stage 3B Moderate CKD (GFR = 30-44 mL/min/1 73 square meters)  •  Stage 4 Severe CKD (GFR = 15-29 mL/min/1 73 square meters)  •  Stage 5 End Stage CKD (GFR <15 mL/min/1 73 square meters)  Note: GFR calculation is accurate only with a steady state creatinine    HS Troponin I 2hr [[id number]]     Lab Status: No result Specimen: Blood     HS Troponin 0hr (reflex protocol) [[id number]] (Normal) Collected: 02/22/23 0905    Lab Status: Final result Specimen: Blood from Line, Venous Updated: 02/22/23 0935     hs TnI 0hr 3 ng/L     CBC and differential [[id number]]  (Abnormal) Collected: 02/22/23 0905    Lab Status: Final result Specimen: Blood from Line, Venous Updated: 02/22/23 0913     WBC 8 63 Thousand/uL      RBC 5 07 Million/uL      Hemoglobin 13 5 g/dL      Hematocrit 43 0 %      MCV 85 fL      MCH 26 6 pg      MCHC 31 4 g/dL      RDW 15 9 %      MPV 10 5 fL      Platelets 955 Thousands/uL      nRBC 0 /100 WBCs      Neutrophils Relative 56 %      Immat GRANS % 1 %      Lymphocytes Relative 33 %      Monocytes Relative 6 %      Eosinophils Relative 3 %      Basophils Relative 1 %      Neutrophils Absolute 4 94 Thousands/µL      Immature Grans Absolute 0 04 Thousand/uL      Lymphocytes Absolute 2 86 Thousands/µL      Monocytes Absolute 0 52 Thousand/µL      Eosinophils Absolute 0 22 Thousand/µL      Basophils Absolute 0 05 Thousands/µL                  XR chest 1 view portable   Final Result by Tiffany Mckeon MD (02/22 7817)      No acute cardiopulmonary disease                    Workstation performed: [id number]                    Procedures  ECG 12 Lead Documentation Only    Date/Time: 2/22/2023 8:57 AM  Performed by: Bi Crowley DO  Authorized by: Bi Crowley DO     ECG reviewed by me, the ED Provider: yes    Patient location:  ED  Previous ECG:     Previous ECG:  Unavailable  Interpretation:     Interpretation: normal    Rate:     ECG rate assessment: normal    Rhythm:     Rhythm: sinus rhythm    Ectopy:     Ectopy: PVCs    QRS:     QRS axis:  Normal  Conduction:     Conduction: normal    ST segments:     ST segments:  Non-specific  T waves:     T waves: non-specific               ED Course  ED Course as of 02/22/23 1012   Wed Feb 22, 2023   0938 Chest x-ray negative   0948 Patient reports relief of pain  Patient and daughter updated   1010 Discussed with medicine and patient will be admitted to that service                               SBIRT 20yo+    Eduard Brennan Most Recent Value   SBIRT (25 yo +)    In order to provide better care to our patients, we are screening all of our patients for alcohol and drug use  Would it be okay to ask you these screening questions? Yes Filed at: 02/22/2023 0700   Initial Alcohol Screen: US AUDIT-C     1  How often do you have a drink containing alcohol? 0 Filed at: 02/22/2023 0915   2  How many drinks containing alcohol do you have on a typical day you are drinking? 0 Filed at: 02/22/2023 0915   3a  Male UNDER 65: How often do you have five or more drinks on one occasion? 0 Filed at: 02/22/2023 0915   3b  FEMALE Any Age, or MALE 65+: How often do you have 4 or more drinks on one occassion? 0 Filed at: 02/22/2023 0915   Audit-C Score 0 Filed at: 02/22/2023 3438   SHERRI: How many times in the past year have you    Used an illegal drug or used a prescription medication for non-medical reasons? Never Filed at: 02/22/2023 0760                    Medical Decision Making  Patient presented to the emergency department and a MSE was performed  The patient was evaluated for complaint related to acute chest pain  Differential diagnoses included, but are not limited to, acute coronary syndrome, arrhythmia, myocardial infarction, pulmonary embolism, pneumothorax, infectious process of the lungs such as pneumonia, GERD, esophagitis, muscle strain, or costochondritis  Several of these diagnoses have been evaluated and ruled out by history and physical   As needed, patient will be further evaluated with laboratory and imaging studies  Higher level diagnostics, such as CT imaging or ultrasound, may also be required  Please see work-up portion of the note for further evaluation of patient's risk  Socioeconomic factors were also considered as part of the decision-making process    Unless otherwise stated in the chart or patient is admitted as elsewhere documented, any deviously prescribed medications will be maintained  Chest pain, unspecified type: complicated acute illness or injury with systemic symptoms  Amount and/or Complexity of Data Reviewed  Independent Historian:      Details: Daughter provided additional hx  Labs: ordered  Decision-making details documented in ED Course  Radiology: ordered  Decision-making details documented in ED Course  ECG/medicine tests: ordered and independent interpretation performed  Decision-making details documented in ED Course  Discussion of management or test interpretation with external provider(s): Patient presented to the emergency department and a MSE was performed  The patient was evaluated and diagnosed with acute chest pain  This is a new issue that will require additional planned work-up and treatment in a hospitalized setting  As may have been required as part of this evaluation, clinical laboratory test, radiology imaging and medical testing (I e  EKG) were ordered as necessitated by the patient's presentation  I independently reviewed these studies, imaging and testing  This patient's case is considered to be a considerable risk secondary to the above listed disease process and poses a threat to the patient's well-being and baseline function  Further in-patient diagnostic testing and management, which may include the administration of parenteral medications, is required  Risk  OTC drugs  Prescription drug management  Decision regarding hospitalization            Disposition  Final diagnoses:   Chest pain, unspecified type     Time reflects when diagnosis was documented in both MDM as applicable and the Disposition within this note     Time User Action Codes Description Comment    2/22/2023 10:08 AM Dolores Weston Add [R07 9] Chest pain     2/22/2023 10:11 AM Joy Mack Add [R07 9] Chest pain, unspecified type       ED Disposition     ED Disposition   Admit    Condition   Stable    Date/Time Wed Feb 22, 2023 10:11 AM    Comment              Follow-up Information    None         Patient's Medications   Discharge Prescriptions    No medications on file       No discharge procedures on file      PDMP Review     None          ED Provider  Electronically Signed by           Mike Leggett DO  02/22/23 1012

## 2023-02-22 NOTE — PLAN OF CARE

## 2023-02-22 NOTE — ASSESSMENT & PLAN NOTE
· PHYLLIS 2  · Risk factors  · EKG nonspecific changes  · Troponin at 0 hours negative we will trend 2 more  · cardiology evaluated plan for stress test tomorrow treadmill nuclear  · Lipid panel  · Now cp free  · Tele  · As and lipitor started by cards  · cxr nml

## 2023-02-22 NOTE — PROGRESS NOTES
Patient continues with #3 Chest pain to Left shoulder radiating to back  Patient states after she received nitro prior pain got better but did not go away  Dr Miguel Mooney aware and requested patient receive nitro prn  EKG was tiger texted to Dr Miguel Mooney

## 2023-02-23 ENCOUNTER — APPOINTMENT (OUTPATIENT)
Dept: NUCLEAR MEDICINE | Facility: HOSPITAL | Age: 58
End: 2023-02-23

## 2023-02-23 ENCOUNTER — APPOINTMENT (INPATIENT)
Dept: NON INVASIVE DIAGNOSTICS | Facility: HOSPITAL | Age: 58
End: 2023-02-23

## 2023-02-23 VITALS
BODY MASS INDEX: 33.69 KG/M2 | HEIGHT: 64 IN | SYSTOLIC BLOOD PRESSURE: 126 MMHG | TEMPERATURE: 98.6 F | WEIGHT: 197.31 LBS | HEART RATE: 66 BPM | OXYGEN SATURATION: 96 % | DIASTOLIC BLOOD PRESSURE: 68 MMHG | RESPIRATION RATE: 20 BRPM

## 2023-02-23 PROBLEM — E78.5 HLD (HYPERLIPIDEMIA): Status: ACTIVE | Noted: 2023-02-23

## 2023-02-23 LAB
ATRIAL RATE: 76 BPM
ATRIAL RATE: 77 BPM
CHEST PAIN STATEMENT: NORMAL
CHOLEST SERPL-MCNC: 230 MG/DL
HBV CORE AB SER QL: NORMAL
HBV CORE IGM SER QL: NORMAL
HBV SURFACE AG SER QL: NORMAL
HCV AB SER QL: NORMAL
HDLC SERPL-MCNC: 26 MG/DL
MAX DIASTOLIC BP: 70 MMHG
MAX HEART RATE: 144 BPM
MAX HR PERCENT: 88 %
MAX HR: 144 BPM
MAX PREDICTED HEART RATE: 163 BPM
MAX. SYSTOLIC BP: 178 MMHG
NONHDLC SERPL-MCNC: 204 MG/DL
NUC STRESS EJECTION FRACTION: 68 %
P AXIS: 53 DEGREES
P AXIS: 57 DEGREES
PR INTERVAL: 126 MS
PR INTERVAL: 132 MS
PROTOCOL NAME: NORMAL
QRS AXIS: 51 DEGREES
QRS AXIS: 57 DEGREES
QRSD INTERVAL: 82 MS
QRSD INTERVAL: 90 MS
QT INTERVAL: 398 MS
QT INTERVAL: 414 MS
QTC INTERVAL: 447 MS
QTC INTERVAL: 468 MS
RATE PRESSURE PRODUCT: NORMAL
REASON FOR TERMINATION: NORMAL
SL CV REST NUCLEAR ISOTOPE DOSE: 10.9 MCI
SL CV STRESS NUCLEAR ISOTOPE DOSE: 32.4 MCI
SL CV STRESS RECOVERY BP: NORMAL MMHG
SL CV STRESS RECOVERY HR: 93 BPM
SL CV STRESS RECOVERY O2 SAT: 98 %
STRESS ANGINA INDEX: 0
STRESS BASELINE BP: NORMAL MMHG
STRESS BASELINE HR: 78 BPM
STRESS O2 SAT REST: 96 %
STRESS PEAK HR: 144 BPM
STRESS POST ESTIMATED WORKLOAD: 7 METS
STRESS POST EXERCISE DUR MIN: 4 MIN
STRESS POST EXERCISE DUR SEC: 45 SEC
STRESS POST O2 SAT PEAK: 98 %
STRESS POST PEAK BP: 178 MMHG
STRESS/REST PERFUSION RATIO: 1.09
T WAVE AXIS: 37 DEGREES
T WAVE AXIS: 51 DEGREES
TARGET HR FORMULA: NORMAL
TEST INDICATION: NORMAL
TIME IN EXERCISE PHASE: NORMAL
TRIGL SERPL-MCNC: 442 MG/DL
VENTRICULAR RATE: 76 BPM
VENTRICULAR RATE: 77 BPM

## 2023-02-23 RX ORDER — FENOFIBRATE 145 MG/1
145 TABLET, COATED ORAL DAILY
Refills: 0
Start: 2023-02-24

## 2023-02-23 RX ORDER — ATORVASTATIN CALCIUM 40 MG/1
40 TABLET, FILM COATED ORAL
Status: DISCONTINUED | OUTPATIENT
Start: 2023-02-23 | End: 2023-02-23 | Stop reason: HOSPADM

## 2023-02-23 RX ORDER — ATORVASTATIN CALCIUM 40 MG/1
40 TABLET, FILM COATED ORAL
Refills: 0
Start: 2023-02-23

## 2023-02-23 RX ORDER — ASPIRIN 81 MG/1
81 TABLET, CHEWABLE ORAL DAILY
Refills: 0
Start: 2023-02-24

## 2023-02-23 RX ORDER — METOPROLOL SUCCINATE 25 MG/1
25 TABLET, EXTENDED RELEASE ORAL DAILY
Refills: 0
Start: 2023-02-24

## 2023-02-23 RX ORDER — FENOFIBRATE 145 MG/1
145 TABLET, COATED ORAL DAILY
Status: DISCONTINUED | OUTPATIENT
Start: 2023-02-23 | End: 2023-02-23 | Stop reason: HOSPADM

## 2023-02-23 RX ORDER — METOPROLOL SUCCINATE 25 MG/1
25 TABLET, EXTENDED RELEASE ORAL DAILY
Status: DISCONTINUED | OUTPATIENT
Start: 2023-02-23 | End: 2023-02-23 | Stop reason: HOSPADM

## 2023-02-23 RX ADMIN — SERTRALINE 100 MG: 100 TABLET, FILM COATED ORAL at 09:37

## 2023-02-23 RX ADMIN — METOPROLOL SUCCINATE 25 MG: 25 TABLET, EXTENDED RELEASE ORAL at 11:44

## 2023-02-23 RX ADMIN — FENOFIBRATE 145 MG: 145 TABLET, FILM COATED ORAL at 11:43

## 2023-02-23 RX ADMIN — ASPIRIN 81 MG 81 MG: 81 TABLET ORAL at 09:37

## 2023-02-23 NOTE — PLAN OF CARE
Problem: Potential for Falls  Goal: Patient will remain free of falls  Description: INTERVENTIONS:  - Educate patient/family on patient safety including physical limitations  - Instruct patient to call for assistance with activity   - Consult OT/PT to assist with strengthening/mobility   - Keep Call bell within reach  - Keep bed low and locked with side rails adjusted as appropriate  - Keep care items and personal belongings within reach  - Initiate and maintain comfort rounds  - Make Fall Risk Sign visible to staff  - Outcome: Progressing     Problem: MOBILITY - ADULT  Goal: Maintain or return to baseline ADL function  Description: INTERVENTIONS:  -  Assess patient's ability to carry out ADLs; assess patient's baseline for ADL function and identify physical deficits which impact ability to perform ADLs (bathing, care of mouth/teeth, toileting, grooming, dressing, etc )  - Assess/evaluate cause of self-care deficits   - Assess range of motion  - Assess patient's mobility; develop plan if impaired  - Assess patient's need for assistive devices and provide as appropriate  - Encourage maximum independence but intervene and supervise when necessary  - Involve family in performance of ADLs  - Assess for home care needs following discharge   - Consider OT consult to assist with ADL evaluation and planning for discharge  - Provide patient education as appropriate  Outcome: Progressing  Goal: Maintains/Returns to pre admission functional level  Description: INTERVENTIONS:  - Perform BMAT or MOVE assessment daily    - Set and communicate daily mobility goal to care team and patient/family/caregiver     - Collaborate with rehabilitation services on mobility goals if consulted  - Ambulate patient 3 times a day  - Out of bed to chair 3 times a day   - Out of bed for meals 3 times a day  - Out of bed for toileting  - Record patient progress and toleration of activity level   Outcome: Progressing     Problem: PAIN - ADULT  Goal: Verbalizes/displays adequate comfort level or baseline comfort level  Description: Interventions:  - Encourage patient to monitor pain and request assistance  - Assess pain using appropriate pain scale  - Administer analgesics based on type and severity of pain and evaluate response  - Implement non-pharmacological measures as appropriate and evaluate response  - Consider cultural and social influences on pain and pain management  - Notify physician/advanced practitioner if interventions unsuccessful or patient reports new pain  Outcome: Progressing     Problem: INFECTION - ADULT  Goal: Absence or prevention of progression during hospitalization  Description: INTERVENTIONS:  - Assess and monitor for signs and symptoms of infection  - Monitor lab/diagnostic results  - Monitor all insertion sites, i e  indwelling lines, tubes, and drains  - Monitor endotracheal if appropriate and nasal secretions for changes in amount and color  - [place] appropriate cooling/warming therapies per order  - Administer medications as ordered  - Instruct and encourage patient and family to use good hand hygiene technique  - Identify and instruct in appropriate isolation precautions for identified infection/condition  Outcome: Progressing     Problem: SAFETY ADULT  Goal: Patient will remain free of falls  Description: INTERVENTIONS:  - Educate patient/family on patient safety including physical limitations  - Instruct patient to call for assistance with activity   - Consult OT/PT to assist with strengthening/mobility   - Keep Call bell within reach  - Keep bed low and locked with side rails adjusted as appropriate  - Keep care items and personal belongings within reach  - Initiate and maintain comfort rounds  - Make Fall Risk Sign visible to staff  - Offer Toileting every 2 Hours, in advance of need  Outcome: Progressing  Goal: Maintain or return to baseline ADL function  Description: INTERVENTIONS:  -  Assess patient's ability to carry out ADLs; assess patient's baseline for ADL function and identify physical deficits which impact ability to perform ADLs (bathing, care of mouth/teeth, toileting, grooming, dressing, etc )  - Assess/evaluate cause of self-care deficits   - Assess range of motion  - Assess patient's mobility; develop plan if impaired  - Assess patient's need for assistive devices and provide as appropriate  - Encourage maximum independence but intervene and supervise when necessary  - Involve family in performance of ADLs  - Assess for home care needs following discharge   - Consider OT consult to assist with ADL evaluation and planning for discharge  - Provide patient education as appropriate  Outcome: Progressing  Goal: Maintains/Returns to pre admission functional level  Description: INTERVENTIONS:  - Perform BMAT or MOVE assessment daily    - Set and communicate daily mobility goal to care team and patient/family/caregiver     - Collaborate with rehabilitation services on mobility goals if consulted  - Ambulate patient 3 times a day  - Out of bed to chair 3 times a day   - Out of bed for meals 3 times a day  - Out of bed for toileting  - Record patient progress and toleration of activity level   Outcome: Progressing     Problem: DISCHARGE PLANNING  Goal: Discharge to home or other facility with appropriate resources  Description: INTERVENTIONS:  - Identify barriers to discharge w/patient and caregiver  - Arrange for needed discharge resources and transportation as appropriate  - Identify discharge learning needs (meds, wound care, etc )  - Arrange for interpretive services to assist at discharge as needed  - Refer to Case Management Department for coordinating discharge planning if the patient needs post-hospital services based on physician/advanced practitioner order or complex needs related to functional status, cognitive ability, or social support system  Outcome: Progressing     Problem: Knowledge Deficit  Goal: Patient/family/caregiver demonstrates understanding of disease process, treatment plan, medications, and discharge instructions  Description: Complete learning assessment and assess knowledge base    Interventions:  - Provide teaching at level of understanding  - Provide teaching via preferred learning methods  Outcome: Progressing     Problem: Prexisting or High Potential for Compromised Skin Integrity  Goal: Skin integrity is maintained or improved  Description: INTERVENTIONS:  - Identify patients at risk for skin breakdown  - Assess and monitor skin integrity  - Assess and monitor nutrition and hydration status  - Monitor labs   - Assess for incontinence   - Turn and reposition patient  - Assist with mobility/ambulation  - Relieve pressure over bony prominences  - Avoid friction and shearing  - Provide appropriate hygiene as needed including keeping skin clean and dry  - Evaluate need for skin moisturizer/barrier cream  - Collaborate with interdisciplinary team   - Patient/family teaching  - Consider wound care consult   Outcome: Progressing

## 2023-02-23 NOTE — DISCHARGE SUMMARY
[address]  Discharge- Jesus Dave 1965, 62 y o  female MRN: [id number]  Unit/Bed#: -01 Encounter: [id number]  Primary Care Provider: Colten Ochoa DO   Date and time admitted to hospital: 2/22/2023  8:37 AM    * Chest pain  Assessment & Plan  · PHYLLIS 2  · Risk factors  · EKG nonspecific changes  · Trop neg  · Treadmill nuclear stress test is positive discussed with cardiology DR veliz spoken to [facility] will need a cardiac catheterization the patient is agreeing to I did speak to medicine service of [facility] accepted by Dr Brendan Leach and cardiology Dr Marissa Fletcher at [facility] is aware patient has uncontrolled hyperlipidemia and this elevation of Nuno at 442 start Tricor 145 mg daily uptitrate Lipitor to 40 mg daily we discussed regarding diet and smoking cessation  · Lipid panel  · Now cp free    · cxr nml    HLD (hyperlipidemia)  Assessment & Plan  · uncontroled   · Start tricor as trigs 145 and also uptitrate lipitor to 40 mg po daily    MDD (major depressive disorder)  Assessment & Plan  · Restart zoloft      Medical Problems     Resolved Problems  Date Reviewed: 2/23/2023   None       Discharging Physician / Practitioner: Keaton Ely MD  PCP: Colten Ochoa DO  Admission Date:   Admission Orders (From admission, onward)     Ordered        02/22/23 5 [facility]  Once                      Discharge Date: 02/23/23    Consultations During Hospital Stay:  · Cardiology     Procedures Performed:   · none    Significant Findings / Test Results:   · Nuclear exercise stress positive     Incidental Findings:   · none    Test Results Pending at Discharge (will require follow up):   · none     Outpatient Tests Requested:  · none    Complications:  none    Reason for Admission: CP    Hospital Course:    Jeuss Dave is a 62 y o  female patient who originally presented to the hospital on 2/22/2023 due to chest pain down to typical EKG nonspecific troponin x3 is negative  Her chest pain was relieved with nitro cardiology consultation exercise nuclear stress test on positive patient also has very uncontrolled triglyceridemia and hyperlipidemia as her Lipitor was uptitrated to 40 mg daily started on Tricor patient is can be transferred to for cardiac catheterization to [facility] accepted by Dr Lukas Krishnamurthy   Cardiology Dr Luli Saleh aware at [facility]        Please see above list of diagnoses and related plan for additional information  Condition at Discharge: stable    Discharge Day Visit / Exam:   Subjective:  Seen and examined no complaints of cp  Vitals: Blood Pressure: 152/67 (02/23/23 1050)  Pulse: 79 (02/23/23 1050)  Temperature: 97 7 °F (36 5 °C) (02/23/23 1050)  Temp Source: Temporal (02/23/23 0700)  Respirations: 19 (02/23/23 1050)  Height: 5' 4" (162 6 cm) (02/22/23 0843)  Weight - Scale: 89 5 kg (197 lb 5 oz) (02/22/23 0843)  SpO2: 96 % (02/23/23 1050)  Exam:   Physical Exam  Vitals and nursing note reviewed  Constitutional:       General: She is not in acute distress  Appearance: She is well-developed  HENT:      Head: Normocephalic and atraumatic  Eyes:      Conjunctiva/sclera: Conjunctivae normal    Cardiovascular:      Rate and Rhythm: Normal rate and regular rhythm  Heart sounds: No murmur heard  Pulmonary:      Effort: Pulmonary effort is normal  No respiratory distress  Breath sounds: Normal breath sounds  No wheezing or rales  Abdominal:      General: There is no distension  Palpations: Abdomen is soft  Tenderness: There is no abdominal tenderness  Musculoskeletal:         General: No swelling  Cervical back: Neck supple  Skin:     General: Skin is warm and dry  Capillary Refill: Capillary refill takes less than 2 seconds  Neurological:      Mental Status: She is alert and oriented to person, place, and time     Psychiatric:         Mood and Affect: Mood normal          Discussion with Family: Updated contact person (daughter) at bedside  Discharge instructions/Information to patient and family:   See after visit summary for information provided to patient and family  Provisions for Follow-Up Care:  See after visit summary for information related to follow-up care and any pertinent home health orders  Disposition:   Home    Planned Readmission: no     Discharge Statement:  I spent >35 minutes discharging the patient  This time was spent on the day of discharge  I had direct contact with the patient on the day of discharge  Greater than 50% of the total time was spent examining patient, answering all patient questions, arranging and discussing plan of care with patient as well as directly providing post-discharge instructions  Additional time then spent on discharge activities  Discharge Medications:  See after visit summary for reconciled discharge medications provided to patient and/or family        **Please Note: This note may have been constructed using a voice recognition system**

## 2023-02-23 NOTE — TRANSPORTATION MEDICAL NECESSITY
Section I - General Information    Name of Patient: Rl Joshua                 : 1965    Medicare #: [id number]  Transport Date: 23 (PCS is valid for round trips on this date and for all repetitive trips in the 60-day range as noted below )  Origin: [address]: [facility]  Is the pt's stay covered under Medicare Part A (PPS/DRG)   []     Closest appropriate facility? If no, why is transport to more distant facility required? Yes  If hospice pt, is this transport related to pt's terminal illness? NA       Section II - Medical Necessity Questionnaire  Ambulance transportation is medically necessary only if other means of transport are contraindicated or would be potentially harmful to the patient  To meet this requirement, the patient must either be "bed confined" or suffer from a condition such that transport by means other than ambulance is contraindicated by the patient's condition  The following questions must be answered by the medical professional signing below for this form to be valid:    1)  Describe the MEDICAL CONDITION (physical and/or mental) of this patient AT [address] [place] [address] that requires the patient to be transported in an ambulance and why transport by other means is contraindicated by the patient's condition: Telemetry monitor    2) Is the patient "bed confined" as defined below? No  To be "be confined" the patient must satisfy all three of the following conditions: (1) unable to get up from bed without Assistance; AND (2) unable to ambulate; AND (3) unable to sit in a chair or wheelchair  3) Can this patient safely be transported by car or wheelchair van (i e , seated during transport without a medical attendant or monitoring)?    No    4) In addition to completing questions 1-3 above, please check any of the following conditions that apply*: *Note: supporting documentation for any boxes checked must be maintained in the patient's medical records  If hosp-hosp transfer, describe services needed at 2nd facility not available at 1st facility? Cardiac monitoring required en route       Section III - Signature of Physician or Healthcare Professional  I certify that the above information is true and correct based on my evaluation of this patient, and represent that the patient requires transport by ambulance and that other forms of transport are contraindicated  I understand that this information will be used by the Centers for Medicare and Medicaid Services (CMS) to support the determination of medical necessity for ambulance services, and I represent that I have personal knowledge of the patient's condition at time of transport  []  If this box is checked, I also certify that the patient is physically or mentally incapable of signing the ambulance service's claim and that the institution with which I am affiliated has furnished care, services, or assistance to the patient  My signature below is made on behalf of the patient pursuant to 42 CFR §424 36(b)(4)  In accordance with 42 CFR §424 37, the specific reason(s) that the patient is physically or mentally incapable of signing the claim form is as follows: N/A  Signature of Physician* or Healthcare Professional _____________________________________________________________  Signature Date 02/23/23 (For scheduled repetitive transports, this form is not valid for transports performed more than 60 days after this date)    Printed Name & Credentials of Physician or Healthcare Professional (MD, , RN, etc )_Nia Coburn RN_______________________________  *Form must be signed by patient's attending physician for scheduled, repetitive transports   For non-repetitive, unscheduled ambulance transports, if unable to obtain the signature of the attending physician, any of the following may sign (choose appropriate option below)  [] Physician Assistant []  Clinical Nurse Specialist []  Registered Nurse  []  Nurse Practitioner  [x] Discharge Planner

## 2023-02-23 NOTE — ASSESSMENT & PLAN NOTE
· PHYLLIS 2  · Risk factors  · EKG nonspecific changes  · Trop neg  · Treadmill nuclear stress test is positive discussed with cardiology DR veliz spoken to [facility] will need a cardiac catheterization the patient is agreeing to I did speak to medicine service of [facility] accepted by Dr Ramos  and cardiology Dr Ketan Wahl at [facility] is aware patient has uncontrolled hyperlipidemia and this elevation of Nuno at 442 start Tricor 145 mg daily uptitrate Lipitor to 40 mg daily we discussed regarding diet and smoking cessation  · Lipid panel  · Now cp free    · cxr nml

## 2023-02-23 NOTE — PLAN OF CARE
Problem: Potential for Falls  Goal: Patient will remain free of falls  Description: INTERVENTIONS:  - Educate patient/family on patient safety including physical limitations  - Instruct patient to call for assistance with activity   - Consult OT/PT to assist with strengthening/mobility   - Keep Call bell within reach  - Keep bed low and locked with side rails adjusted as appropriate  - Keep care items and personal belongings within reach  - Initiate and maintain comfort rounds  - Make Fall Risk Sign visible to staff    - Apply yellow socks and bracelet for high fall risk patients  - Consider moving patient to room near nurses station  Outcome: Progressing     Problem: MOBILITY - ADULT  Goal: Maintain or return to baseline ADL function  Description: INTERVENTIONS:  -  Assess patient's ability to carry out ADLs; assess patient's baseline for ADL function and identify physical deficits which impact ability to perform ADLs (bathing, care of mouth/teeth, toileting, grooming, dressing, etc )  - Assess/evaluate cause of self-care deficits   - Assess range of motion  - Assess patient's mobility; develop plan if impaired  - Assess patient's need for assistive devices and provide as appropriate  - Encourage maximum independence but intervene and supervise when necessary  - Involve family in performance of ADLs  - Assess for home care needs following discharge   - Consider OT consult to assist with ADL evaluation and planning for discharge  - Provide patient education as appropriate  Outcome: Progressing  Goal: Maintains/Returns to pre admission functional level  Description: INTERVENTIONS:  - Perform BMAT or MOVE assessment daily    - Set and communicate daily mobility goal to care team and patient/family/caregiver     - Collaborate with rehabilitation services on mobility goals if consulted    - Out of bed for toileting  - Record patient progress and toleration of activity level   Outcome: Progressing     Problem: PAIN - ADULT  Goal: Verbalizes/displays adequate comfort level or baseline comfort level  Description: Interventions:  - Encourage patient to monitor pain and request assistance  - Assess pain using appropriate pain scale  - Administer analgesics based on type and severity of pain and evaluate response  - Implement non-pharmacological measures as appropriate and evaluate response  - Consider cultural and social influences on pain and pain management  - Notify physician/advanced practitioner if interventions unsuccessful or patient reports new pain  Outcome: Progressing     Problem: INFECTION - ADULT  Goal: Absence or prevention of progression during hospitalization  Description: INTERVENTIONS:  - Assess and monitor for signs and symptoms of infection  - Monitor lab/diagnostic results  - Monitor all insertion sites, i e  indwelling lines, tubes, and drains  - Monitor endotracheal if appropriate and nasal secretions for changes in amount and color  - [place] appropriate cooling/warming therapies per order  - Administer medications as ordered  - Instruct and encourage patient and family to use good hand hygiene technique  - Identify and instruct in appropriate isolation precautions for identified infection/condition  Outcome: Progressing     Problem: SAFETY ADULT  Goal: Patient will remain free of falls  Description: INTERVENTIONS:  - Educate patient/family on patient safety including physical limitations  - Instruct patient to call for assistance with activity   - Consult OT/PT to assist with strengthening/mobility   - Keep Call bell within reach  - Keep bed low and locked with side rails adjusted as appropriate  - Keep care items and personal belongings within reach  - Initiate and maintain comfort rounds    - Apply yellow socks and bracelet for high fall risk patients  - Consider moving patient to room near nurses station  Outcome: Progressing  Goal: Maintain or return to baseline ADL function  Description: INTERVENTIONS:  -  Assess patient's ability to carry out ADLs; assess patient's baseline for ADL function and identify physical deficits which impact ability to perform ADLs (bathing, care of mouth/teeth, toileting, grooming, dressing, etc )  - Assess/evaluate cause of self-care deficits   - Assess range of motion  - Assess patient's mobility; develop plan if impaired  - Assess patient's need for assistive devices and provide as appropriate  - Encourage maximum independence but intervene and supervise when necessary  - Involve family in performance of ADLs  - Assess for home care needs following discharge   - Consider OT consult to assist with ADL evaluation and planning for discharge  - Provide patient education as appropriate  Outcome: Progressing  Goal: Maintains/Returns to pre admission functional level  Description: INTERVENTIONS:  - Perform BMAT or MOVE assessment daily    - Set and communicate daily mobility goal to care team and patient/family/caregiver     - Collaborate with rehabilitation services on mobility goals if consulted    - Out of bed for toileting  - Record patient progress and toleration of activity level   Outcome: Progressing     Problem: DISCHARGE PLANNING  Goal: Discharge to home or other facility with appropriate resources  Description: INTERVENTIONS:  - Identify barriers to discharge w/patient and caregiver  - Arrange for needed discharge resources and transportation as appropriate  - Identify discharge learning needs (meds, wound care, etc )  - Arrange for interpretive services to assist at discharge as needed  - Refer to Case Management Department for coordinating discharge planning if the patient needs post-hospital services based on physician/advanced practitioner order or complex needs related to functional status, cognitive ability, or social support system  Outcome: Progressing     Problem: Knowledge Deficit  Goal: Patient/family/caregiver demonstrates understanding of disease process, treatment plan, medications, and discharge instructions  Description: Complete learning assessment and assess knowledge base    Interventions:  - Provide teaching at level of understanding  - Provide teaching via preferred learning methods  Outcome: Progressing     Problem: Prexisting or High Potential for Compromised Skin Integrity  Goal: Skin integrity is maintained or improved  Description: INTERVENTIONS:  - Identify patients at risk for skin breakdown  - Assess and monitor skin integrity  - Assess and monitor nutrition and hydration status  - Monitor labs   - Assess for incontinence   - Turn and reposition patient  - Assist with mobility/ambulation  - Relieve pressure over bony prominences  - Avoid friction and shearing  - Provide appropriate hygiene as needed including keeping skin clean and dry  - Evaluate need for skin moisturizer/barrier cream  - Collaborate with interdisciplinary team   - Patient/family teaching  - Consider wound care consult   Outcome: Progressing

## 2023-02-23 NOTE — UTILIZATION REVIEW
Initial Clinical Review    Admission: Date/Time/Statement:   Admission Orders (From admission, onward)     Ordered        02/22/23 1011  INPATIENT ADMISSION  Once                      Orders Placed This Encounter   Procedures   • INPATIENT ADMISSION     Standing Status:   Standing     Number of Occurrences:   1     Order Specific Question:   Level of Care     Answer:   Med Surg [16]     Order Specific Question:   Estimated length of stay     Answer:   More than 2 Midnights     Order Specific Question:   Certification     Answer:   I certify that inpatient services are medically necessary for this patient for a duration of greater than two midnights  See H&P and MD Progress Notes for additional information about the patient's course of treatment  ED Arrival Information     Expected   -    Arrival   2/22/2023 08:37    Acuity   Emergent            Means of arrival   Walk-In    Escorted by   Family Member    Service   Hospitalist    Admission type   Emergency            Arrival complaint   chest & neck pain  onset - 3 days ago           Chief Complaint   Patient presents with   • Chest Pain     Pt c/o upper left chest pain radiating to neck and left shoulder w/weakness, diarrhea and sob upon ambulating for past 3 days  Started with cough today  Pt took 81mg asa this morning  Denies travel/fevers/n/v       Initial Presentation: 62 y o  female to ED from home w/  waxing and waning chest pain for about 3 days she stated started when she was walking around the house it actually went away after nitroglycerin given in the ER  Mild diaphoresis and shortness of breath and mild nausea the chest pain was described as pressure  NP cough for a couple days   Admitted IP status w/ CP plan for serial trop , cardiology consult , lipid panel , tele   2/22 Cardiology Consult   CP , EKG non specific changes  Trop neg   Plan for asa, lipitor , tele , serial trop    Plan for nuclear stress test 2/23 2/23 PT for transfer for cardiac cath     ED Triage Vitals [02/22/23 0843]   Temperature Pulse Respirations Blood Pressure SpO2   97 7 °F (36 5 °C) 87 18 (!) 179/84 99 %      Temp Source Heart Rate Source Patient Position - Orthostatic VS BP Location FiO2 (%)   Temporal Monitor Lying Left arm --      Pain Score       7          Wt Readings from Last 1 Encounters:   02/22/23 89 5 kg (197 lb 5 oz)     Additional Vital Signs:   02/23/23 10:50:50 97 7 °F (36 5 °C) 79 19 152/67 95 96 % -- --   02/23/23 0930 -- -- -- -- -- 94 % None (Room air) --   02/23/23 0700 98 7 °F (37 1 °C) 70 19 141/68 -- 94 % None (Room air) Sitting   02/22/23 22:36:10 97 9 °F (36 6 °C) 89 18 125/65 85 93 % -- --   02/22/23 2100 -- -- -- -- -- 95 % None (Room air) --   02/22/23 18:43:37 98 1 °F (36 7 °C) 80 18 125/74 91 94 % -- --   02/22/23 14:49:11 -- 87 -- 125/67 86 94 % -- --   02/22/23 14:39:27 -- 87 -- 162/74 103 96 % -- --   02/22/23 1236 -- -- -- -- -- -- None (Room air) --   02/22/23 11:26:54 98 1 °F (36 7 °C) 75 18 126/71 113 95 % -- Lying   02/22/23 1100 -- 73 20 -- -- 98 % -- --   02/22/23 1015 -- 74 23 Abnormal  116/59 81 95 % -- --   02/22/23 1000 -- 77 21 112/58 79 94 % -- --   02/22/23 0922 -- 92 -- 119/58 -- -- -- --   02/22/23 0916 -- -- -- -- -- -- None (Room air) --   02/22/23 0915 -- 97 19 119/58 83 97 % -- --   02/22/23 0912 -- 92 -- 129/61 -- -- -- --   02/22/23 0858 -- -- -- 159/78 -- --         Pertinent Labs/Diagnostic Test Results:   2/23 Stress test   2/22 EKG Normal sinus rhythm  Nonspecific ST abnormality  Abnormal ECG  XR chest 1 view portable   Final Result by Hanane Merino MD (02/22 3744)      No acute cardiopulmonary disease                    Workstation performed: [id number]           Results from last 7 days   Lab Units 02/22/23  0905   SARS-COV-2  Negative     Results from last 7 days   Lab Units 02/22/23 0905   WBC Thousand/uL 8 63   HEMOGLOBIN g/dL 13 5   HEMATOCRIT % 43 0   PLATELETS Thousands/uL 293   NEUTROS ABS Thousands/µL 4 94     Results from last 7 days   Lab Units 02/22/23  0905   SODIUM mmol/L 140   POTASSIUM mmol/L 3 7   CHLORIDE mmol/L 105   CO2 mmol/L 27   ANION GAP mmol/L 8   BUN mg/dL 8   CREATININE mg/dL 0 76   EGFR ml/min/1 73sq m 87   CALCIUM mg/dL 9 2     Results from last 7 days   Lab Units 02/22/23  0905   AST U/L 16   ALT U/L 10   ALK PHOS U/L 88   TOTAL PROTEIN g/dL 7 3   ALBUMIN g/dL 4 2   TOTAL BILIRUBIN mg/dL 0 35     Results from last 7 days   Lab Units 02/22/23  0905   GLUCOSE RANDOM mg/dL 134       Results from last 7 days   Lab Units 02/22/23  1326 02/22/23  1105 02/22/23  0905   HS TNI 0HR ng/L  --   --  3   HS TNI 2HR ng/L  --  3  --    HSTNI D2 ng/L  --  0  --    HS TNI 4HR ng/L 4  --   --    HSTNI D4 ng/L 1  --   --      Results from last 7 days   Lab Units 02/22/23  0905   INFLUENZA A PCR  Negative   INFLUENZA B PCR  Negative   RSV PCR  Negative     ED Treatment:   Medication Administration from 02/22/2023 0836 to 02/22/2023 1119       Date/Time Order Dose Route Action     02/22/2023 0921 EST nitroglycerin (NITROSTAT) SL tablet 0 4 mg 0 4 mg Sublingual Given     02/22/2023 0911 EST nitroglycerin (NITROSTAT) SL tablet 0 4 mg 0 4 mg Sublingual Given     02/22/2023 5845 EST nitroglycerin (NITROSTAT) SL tablet 0 4 mg 0 4 mg Sublingual Given     02/22/2023 0910 EST aspirin chewable tablet 324 mg 243 mg Oral Given        Past Medical History:   Diagnosis Date   • Hyperlipidemia      Present on Admission:  **None**      Admitting Diagnosis: Chest pain [R07 9]  Chest pain, unspecified type [R07 9]  Age/Sex: 62 y o  female  Admission Orders:  Scheduled Medications:  aspirin, 81 mg, Oral, Daily  atorvastatin, 40 mg, Oral, Daily With Dinner  fenofibrate, 145 mg, Oral, Daily  metoprolol succinate, 25 mg, Oral, Daily  sertraline, 100 mg, Oral, Daily      Continuous IV Infusions:     PRN Meds:  acetaminophen, 650 mg, Oral, Q6H PRN  nitroglycerin, 0 4 mg, Sublingual, Q5 Min PRN    NPO   Cardiac diet   Tele IP CONSULT TO CARDIOLOGY    Network Utilization Review Department  ATTENTION: Please call with any questions or concerns to [phone] and carefully listen to the prompts so that you are directed to the right person  All voicemails are confidential   Candia Siemens all requests for admission clinical reviews, approved or denied determinations and any other requests to dedicated fax number below belonging to the campus where the patient is receiving treatment   List of dedicated fax numbers for the Facilities:  [address] DENIALS (Administrative/Medical Necessity) [phone]   [address] (Maternity/NICU/Pediatrics) [phone]   [address]ra Deshpande [phone]   [place] [address] [phone]   [address] [place] [address] [address] [phone]   [address] [facility] [address] 8[address] [facility] [phone]

## 2023-02-23 NOTE — EMTALA/ACUTE CARE TRANSFER
[facility]  [address]  [place] [address]  No information on file  ACUTE CARE TRANSFER CONSENT    NAME Suha CRISOSTOMO 1965                              MRN [id number]    I have been informed of my rights regarding examination, treatment, and transfer   by Dr George Weldon MD    Benefits:      Risks:        Consent for Transfer:  I acknowledge that my medical condition has been evaluated and explained to me by the treating physician or other qualified medical person and/or my attending physician, who has recommended that I be transferred to the service of    at    The above potential benefits of such transfer, the potential risks associated with such transfer, and the probable risks of not being transferred have been explained to me, and I fully understand them  The doctor has explained that, in my case, the benefits of transfer outweigh the risks  I agree to be transferred  I authorize the performance of emergency medical procedures and treatments upon me in both transit and upon arrival at the receiving facility  Additionally, I authorize the release of any and all medical records to the receiving facility and request they be transported with me, if possible  I understand that the safest mode of transportation during a medical emergency is an ambulance and that the Hospital advocates the use of this mode of transport  Risks of traveling to the receiving facility by car, including absence of medical control, life sustaining equipment, such as oxygen, and medical personnel has been explained to me and I fully understand them  (RACHEL CORRECT BOX BELOW)  [  ]  I consent to the stated transfer and to be transported by ambulance/helicopter  [  ]  I consent to the stated transfer, but refuse transportation by ambulance and accept full responsibility for my transportation by car    I understand the risks of non-ambulance transfers and I exonerate the Hospital and its staff from any deterioration in my condition that results from this refusal     X___________________________________________    DATE  23  TIME________  Signature of patient or legally responsible individual signing on patient behalf           RELATIONSHIP TO PATIENT_________________________          Provider Certification    NAME Nicci Silvestre                                         1965                              MRN [id number]    A medical screening exam was performed on the above named patient  Based on the examination:    Condition Necessitating Transfer cardiac cath    Patient Condition:      Reason for Transfer:      Transfer Requirements: Facility     · Space available and qualified personnel available for treatment as acknowledged by    · Agreed to accept transfer and to provide appropriate medical treatment as acknowledged by          · Appropriate medical records of the examination and treatment of the patient are provided at the time of transfer   [address] [place] [address] [address] _______  · Transfer will be performed by qualified personnel from    and appropriate transfer equipment as required, including the use of necessary and appropriate life support measures      Provider Certification: I have examined the patient and explained the following risks and benefits of being transferred/refusing transfer to the patient/family:         Based on these reasonable risks and benefits to the patient and/or the unborn child(allegra), and based upon the information available at the time of the patient’s examination, I certify that the medical benefits reasonably to be expected from the provision of appropriate medical treatments at another medical facility outweigh the increasing risks, if any, to the individual’s medical condition, and in the case of labor to the unborn child, from effecting the transfer      X____________________________________________ DATE 02/23/23        TIME_______      ORIGINAL - SEND TO MEDICAL RECORDS   COPY - SEND WITH PATIENT DURING TRANSFER

## 2023-02-23 NOTE — PROGRESS NOTES
Progress Note - Cardiology   Iris Upton 62 y o  female MRN: [id number]  Unit/Bed#: -01 Encounter: [id number]    Assessment:  Chest pain- constant over the last three days that is sometimes worse with exertion               - ekg non specific changes              - troponin negative x 3   - exercise nuc today abnormal concerning for inferior ischemia   - would now consider her stable angina   HLD with significantly elevated trig  Smoking  Fhx of CAD     Plan:  1  Agree with up titration of Lipitor to 40 mg daily  2  Continue ASA  3  Start Toprol xl 25 mg PO daily  4  Discuss plan for LHC whether that be IP vs OP  Given that she has pain walking minimally around her house would like to do this as an IP  Will recommend [facility] to [facility]  Subjective/Objective     Subjective: pt reports trouble breathing during stress test  Had no chest pain during the study  Today she reports that she has mostly noticed now that her pain was with exertion and relieved with rest     Objective: positive stress test although pending  Vitals: /68 (BP Location: Right arm)   Pulse 70   Temp 98 7 °F (37 1 °C) (Temporal)   Resp 19   Ht 5' 4" (1 626 m)   Wt 89 5 kg (197 lb 5 oz)   SpO2 94%   BMI 33 87 kg/m²   Vitals:    02/22/23 0843   Weight: 89 5 kg (197 lb 5 oz)     Orthostatic Blood Pressures    Flowsheet Row Most Recent Value   Blood Pressure 141/68 filed at 02/23/2023 0700   Patient Position - Orthostatic VS Sitting filed at 02/23/2023 0700            Intake/Output Summary (Last 24 hours) at 2/23/2023 8541  Last data filed at 2/22/2023 1700  Gross per 24 hour   Intake 240 ml   Output --   Net 240 ml       Invasive Devices     Peripheral Intravenous Line  Duration           Peripheral IV 02/22/23 Right Hand 1 day                     Physical Exam  Vitals and nursing note reviewed  Constitutional:       Appearance: Normal appearance  HENT:      Head: Normocephalic and atraumatic     Cardiovascular: Rate and Rhythm: Normal rate  Heart sounds: No murmur heard  No gallop  Pulmonary:      Effort: Pulmonary effort is normal       Breath sounds: No wheezing  Abdominal:      Palpations: Abdomen is soft  Musculoskeletal:         General: No swelling  Cervical back: Neck supple  Skin:     General: Skin is warm  Capillary Refill: Capillary refill takes less than 2 seconds  Neurological:      General: No focal deficit present  Mental Status: She is alert and oriented to person, place, and time  Psychiatric:         Mood and Affect: Mood normal          Thought Content: Thought content normal      Lab Results:   I have personally reviewed pertinent lab results  CBC with diff:   Results from last 7 days   Lab Units 02/22/23  0905   WBC Thousand/uL 8 63   RBC Million/uL 5 07   HEMOGLOBIN g/dL 13 5   HEMATOCRIT % 43 0   MCV fL 85   MCH pg 26 6*   MCHC g/dL 31 4   RDW % 15 9*   MPV fL 10 5   PLATELETS Thousands/uL 293     CMP:   Results from last 7 days   Lab Units 02/22/23  0905   SODIUM mmol/L 140   CHLORIDE mmol/L 105   CO2 mmol/L 27   BUN mg/dL 8   CREATININE mg/dL 0 76   CALCIUM mg/dL 9 2   AST U/L 16   ALT U/L 10   ALK PHOS U/L 88   EGFR ml/min/1 73sq m 87     HS Troponin:   0   Lab Value Date/Time    HSTNI0 3 02/22/2023 0905    HSTNI2 3 02/22/2023 1105    HSTNI4 4 02/22/2023 1326     BNP:   Results from last 7 days   Lab Units 02/22/23  0905   POTASSIUM mmol/L 3 7   CHLORIDE mmol/L 105   CO2 mmol/L 27   BUN mg/dL 8   CREATININE mg/dL 0 76   CALCIUM mg/dL 9 2   EGFR ml/min/1 73sq m 87     Coags:     TSH:     Magnesium:     Lipid Profile:   Results from last 7 days   Lab Units 02/23/23  0508   HDL mg/dL 26*   TRIGLYCERIDES mg/dL 442*     Imaging: I have personally reviewed pertinent reports

## 2023-02-24 NOTE — UTILIZATION REVIEW
NOTIFICATION OF ADMISSION DISCHARGE   This is a Notification of Discharge from [facility]  Please be advised that this patient has been discharge from our facility  Below you will find the admission and discharge date and time including the patient’s disposition  UTILIZATION REVIEW CONTACT:  [address] [place]  Utilization   Network Utilization Review Department  Phone: [phone] x carefully listen to the prompts  All voicemails are confidential   Email: [email or web address]     ADMISSION INFORMATION  PRESENTATION DATE: 2/22/2023  8:37 AM  OBERVATION ADMISSION DATE:   INPATIENT ADMISSION DATE: 2/22/23 10:11 AM   DISCHARGE DATE: 2/23/2023  4:41 PM   DISPOSITION:Non [facility] Acute [facility] Term [facility]    IMPORTANT INFORMATION:  Send all requests for admission clinical reviews, approved or denied determinations and any other requests to dedicated fax number below belonging to the campus where the patient is receiving treatment   List of dedicated fax numbers:  [address] DENIALS (Administrative/Medical Necessity) [phone]   [address] (Maternity/NICU/Pediatrics) [phone]   [facility] [phone]   [facility] 87 [phone]   [address] [phone]   220 [facility] [phone]   90 [facility] [phone]   [address] [place] [address] 119 [phone]   [facility]  [phone]   [address] [facility] [phone]   412 [facility] [address] [facility] [phone]

## 2023-02-27 LAB
CHEST PAIN STATEMENT: NORMAL
MAX DIASTOLIC BP: 70 MMHG
MAX HEART RATE: 144 BPM
MAX PREDICTED HEART RATE: 163 BPM
MAX. SYSTOLIC BP: 178 MMHG
PROTOCOL NAME: NORMAL
REASON FOR TERMINATION: NORMAL
TARGET HR FORMULA: NORMAL
TEST INDICATION: NORMAL
TIME IN EXERCISE PHASE: NORMAL

## 2023-06-05 DIAGNOSIS — E78.5 HYPERLIPIDEMIA, UNSPECIFIED: ICD-10-CM

## 2023-06-05 DIAGNOSIS — F17.200 NICOTINE DEPENDENCE, UNSPECIFIED, UNCOMPLICATED: ICD-10-CM

## 2023-06-05 DIAGNOSIS — I25.810 ATHEROSCLEROSIS OF CORONARY ARTERY BYPASS GRAFT(S) WITHOUT ANGINA PECTORIS: ICD-10-CM

## 2023-06-05 DIAGNOSIS — M79.661 PAIN IN RIGHT LOWER LEG: ICD-10-CM

## 2023-06-05 DIAGNOSIS — M79.662 PAIN IN LEFT LOWER LEG: ICD-10-CM

## 2023-06-20 ENCOUNTER — HOSPITAL ENCOUNTER (OUTPATIENT)
Dept: NON INVASIVE DIAGNOSTICS | Facility: HOSPITAL | Age: 58
Discharge: HOME | End: 2023-06-20
Payer: COMMERCIAL

## 2023-06-20 DIAGNOSIS — I25.810 ATHEROSCLEROSIS OF CORONARY ARTERY BYPASS GRAFT(S) WITHOUT ANGINA PECTORIS: ICD-10-CM

## 2023-06-20 DIAGNOSIS — M79.662 PAIN IN LEFT LOWER LEG: ICD-10-CM

## 2023-06-20 DIAGNOSIS — M79.661 PAIN IN RIGHT LOWER LEG: ICD-10-CM

## 2023-06-20 DIAGNOSIS — E78.5 HYPERLIPIDEMIA, UNSPECIFIED: ICD-10-CM

## 2023-06-20 DIAGNOSIS — F17.200 NICOTINE DEPENDENCE, UNSPECIFIED, UNCOMPLICATED: ICD-10-CM

## 2023-06-20 PROCEDURE — 93922 UPR/L XTREMITY ART 2 LEVELS: CPT | Performed by: SURGERY

## 2023-06-20 PROCEDURE — 93922 UPR/L XTREMITY ART 2 LEVELS: CPT

## 2024-03-17 ENCOUNTER — OFFICE VISIT (OUTPATIENT)
Dept: URGENT CARE | Facility: CLINIC | Age: 59
End: 2024-03-17
Payer: COMMERCIAL

## 2024-03-17 ENCOUNTER — APPOINTMENT (OUTPATIENT)
Dept: RADIOLOGY | Facility: CLINIC | Age: 59
End: 2024-03-17
Payer: COMMERCIAL

## 2024-03-17 VITALS
DIASTOLIC BLOOD PRESSURE: 68 MMHG | RESPIRATION RATE: 18 BRPM | OXYGEN SATURATION: 97 % | WEIGHT: 195 LBS | TEMPERATURE: 99.9 F | BODY MASS INDEX: 33.29 KG/M2 | HEART RATE: 86 BPM | HEIGHT: 64 IN | SYSTOLIC BLOOD PRESSURE: 124 MMHG

## 2024-03-17 DIAGNOSIS — R05.1 ACUTE COUGH: ICD-10-CM

## 2024-03-17 DIAGNOSIS — J18.9 PNEUMONIA OF LEFT LOWER LOBE DUE TO INFECTIOUS ORGANISM: Primary | ICD-10-CM

## 2024-03-17 PROCEDURE — 71046 X-RAY EXAM CHEST 2 VIEWS: CPT

## 2024-03-17 PROCEDURE — 99213 OFFICE O/P EST LOW 20 MIN: CPT

## 2024-03-17 RX ORDER — OMEGA-3/DHA/EPA/FISH OIL 60 MG-90MG
CAPSULE ORAL
COMMUNITY

## 2024-03-17 RX ORDER — AZITHROMYCIN 250 MG/1
TABLET, FILM COATED ORAL
Qty: 6 TABLET | Refills: 0 | Status: SHIPPED | OUTPATIENT
Start: 2024-03-17 | End: 2024-03-21

## 2024-03-17 RX ORDER — CLOPIDOGREL BISULFATE 75 MG/1
75 TABLET ORAL DAILY
COMMUNITY
Start: 2024-02-23

## 2024-03-17 RX ORDER — ALBUTEROL SULFATE 90 UG/1
2 AEROSOL, METERED RESPIRATORY (INHALATION) EVERY 6 HOURS PRN
Qty: 8.5 G | Refills: 0 | Status: SHIPPED | OUTPATIENT
Start: 2024-03-17

## 2024-03-17 RX ORDER — PREDNISONE 10 MG/1
TABLET ORAL
Qty: 21 TABLET | Refills: 0 | Status: SHIPPED | OUTPATIENT
Start: 2024-03-17

## 2024-03-17 NOTE — PATIENT INSTRUCTIONS
Take antibiotic as prescribed  Take steroid as prescribed  Use inhaler as needed for shortness of breath or wheezing  Plenty of fluids  Can use honey   Cool mist humidifier   Warm gargle with salt water for sore throat   Use Tylenol as needed for fever or pain    Follow up with PCP in 3-5 days.  Proceed to  ER if symptoms worsen.    If tests are performed, our office will contact you with results only if changes need to made to the care plan discussed with you at the visit. You can review your full results on [facility] Mychart.

## 2024-03-17 NOTE — PROGRESS NOTES
[facility]        NAME: Barbara Bainbridge is a 58 y.o. female  : 1965    MRN: [id number]  DATE: 2024  TIME: 1:31 PM    Assessment and Plan   Pneumonia of left lower lobe due to infectious organism [J18.9]  1. Pneumonia of left lower lobe due to infectious organism  azithromycin (ZITHROMAX) 250 mg tablet    predniSONE 10 mg tablet    albuterol (ProAir HFA) 90 mcg/act inhaler      2. Acute cough  XR chest pa & lateral        Preliminary xray read by myself. Suspicious for LLL PNA. Pending radiologist final read.      Patient has documented allergy to Keflex, Amoxicillin and Doxycycline. Will do Azithromycin and prednisone as well as inhaler to help with symptoms. Gave strict instructions on when to proceed to ER such as if increase in blood in mucus, SOB, CP, difficulty breathing, or other worrisome symptoms arise. Patient verbalized understanding.     Patient Instructions     Take antibiotic as prescribed  Take steroid as prescribed  Use inhaler as needed for shortness of breath or wheezing  Plenty of fluids  Can use honey   Cool mist humidifier   Warm gargle with salt water for sore throat   Use Tylenol as needed for fever or pain    Follow up with PCP in 3-5 days.  Proceed to  ER if symptoms worsen.    If tests are performed, our office will contact you with results only if changes need to made to the care plan discussed with you at the visit. [facility]    Chief Complaint     Chief Complaint   Patient presents with    Cold Like Symptoms     Cough, congestion and spitting up some blood in mucous x 3 days         History of Present Illness       Cough  This is a new problem. Episode onset: 3 days. The cough is Productive of blood-tinged sputum and productive of sputum. Associated symptoms include chills. Pertinent negatives include no chest pain, ear pain, fever, postnasal drip, rhinorrhea, sore throat, shortness of breath or wheezing.        Review of Systems   Review of Systems   Constitutional:  Positive for chills. Negative for diaphoresis, fatigue and fever.   HENT:  Positive for congestion. Negative for ear pain, postnasal drip, rhinorrhea, sinus pressure, sore throat and trouble swallowing.    Respiratory:  Positive for cough (productive). Negative for chest tightness, shortness of breath and wheezing.    Cardiovascular:  Negative for chest pain and palpitations.   Skin:  Negative for color change.   Neurological:  Negative for dizziness and light-headedness.   Psychiatric/Behavioral:  Negative for sleep disturbance.          Current Medications       Current Outpatient Medications:     albuterol (ProAir HFA) 90 mcg/act inhaler, Inhale 2 puffs every 6 (six) hours as needed for wheezing or shortness of breath, Disp: 8.5 g, Rfl: 0    aspirin 81 mg chewable tablet, Chew 1 tablet (81 mg total) daily Do not start before February 24, 2023., Disp: , Rfl: 0    atorvastatin (LIPITOR) 40 mg tablet, Take 1 tablet (40 mg total) by mouth daily with dinner, Disp: , Rfl: 0    azithromycin (ZITHROMAX) 250 mg tablet, Take 2 tablets today then 1 tablet daily x 4 days, Disp: 6 tablet, Rfl: 0    clopidogrel (PLAVIX) 75 mg tablet, Take 75 mg by mouth daily, Disp: , Rfl:     fenofibrate (TRICOR) 145 mg tablet, Take 1 tablet (145 mg total) by mouth daily Do not start before February 24, 2023., Disp: , Rfl: 0    metoprolol succinate (TOPROL-XL) 25 mg 24 hr tablet, Take 1 tablet (25 mg total) by mouth daily Do not start before February 24, 2023., Disp: , Rfl: 0    Multiple Vitamin (Multi-Vitamin) tablet, Take 1 tablet by mouth daily, Disp: , Rfl:     Omega-3 Fatty Acids (Fish Oil) 500 MG CAPS, Take by mouth, Disp: , Rfl:     predniSONE 10 mg tablet, Take six pills on day 1, take five pills on day 2, take four pills on day 3, take three pills on day 4, take two pills on day 5, take one pill on day 6, Disp: 21 tablet, Rfl: 0    sertraline (ZOLOFT) 50 mg tablet, Take  "100 mg by mouth daily , Disp: , Rfl:     Current Allergies     Allergies as of 2024 - Reviewed 2024   Allergen Reaction Noted    Doxycycline Vomiting 2018    Cephalexin Other (See Comments) and Rash 2017    Morphine Rash and Vomiting 2021    Penicillins Rash 2021            The following portions of the patient's history were reviewed and updated as appropriate: allergies, current medications, past family history, past medical history, past social history, past surgical history and problem list.     Past Medical History:   Diagnosis Date    High triglycerides     Hyperlipidemia     Hypertension     Myocardial infarct, old     PVD (peripheral vascular disease) (HCC)        Past Surgical History:   Procedure Laterality Date    AUGMENTATION BREAST       SECTION      x 2    CHOLECYSTECTOMY      CORONARY ARTERY BYPASS GRAFT      x3 vessels    HYSTERECTOMY      MULTIPLE TOOTH EXTRACTIONS         Family History   Problem Relation Age of Onset    Heart attack Father     Stroke Mother     Breast cancer Sister     Diabetes Sister     Diabetes Brother     Cancer Brother          Medications have been verified.        Objective   /68   Pulse 86   Temp 99.9 °F (37.7 °C)   Resp 18   Ht 5' 4\" (1.626 m)   Wt 88.5 kg (195 lb)   SpO2 97%   BMI 33.47 kg/m²        Physical Exam     Physical Exam  Constitutional:       General: She is not in acute distress.     Appearance: Normal appearance. She is not ill-appearing.   HENT:      Head: Normocephalic.      Right Ear: Tympanic membrane and external ear normal.      Left Ear: Tympanic membrane and external ear normal.      Nose: No congestion.      Mouth/Throat:      Mouth: Mucous membranes are moist.      Pharynx: Oropharynx is clear.   Cardiovascular:      Rate and Rhythm: Normal rate and regular rhythm.      Pulses: Normal pulses.      Heart sounds: Normal heart sounds.   Pulmonary:      Effort: Pulmonary effort is normal. No " respiratory distress.      Breath sounds: No stridor. Decreased breath sounds and rhonchi present. No wheezing or rales.   Lymphadenopathy:      Cervical: No cervical adenopathy.   Skin:     General: Skin is warm and dry.   Neurological:      General: No focal deficit present.      Mental Status: She is alert and oriented to person, place, and time. Mental status is at baseline.   Psychiatric:         Mood and Affect: Mood normal.         Behavior: Behavior normal.         Thought Content: Thought content normal.         Judgment: Judgment normal.

## 2024-03-17 NOTE — LETTER
March 17, 2024     Patient: Barbara Bainbridge   YOB: 1965   Date of Visit: 3/17/2024       To Whom It May Concern:    It is my medical opinion that Barbara Bainbridge may return to work once fever free for greater than 24 hours without fever reducing agents.    If you have any questions or concerns, please don't hesitate to call.         Sincerely,        Rodri Gilliam PA-C    CC: No Recipients

## 2024-03-18 ENCOUNTER — TELEPHONE (OUTPATIENT)
Dept: URGENT CARE | Facility: CLINIC | Age: 59
End: 2024-03-18

## 2024-03-18 NOTE — TELEPHONE ENCOUNTER
Called patient in regards to chest x-ray.  Advised PCP follow-up to ensure resolution.  States she is feeling better today

## 2024-06-19 ENCOUNTER — HOSPITAL ENCOUNTER (OUTPATIENT)
Dept: RADIOLOGY | Facility: CLINIC | Age: 59
Discharge: HOME | End: 2024-06-19
Payer: COMMERCIAL

## 2024-06-19 VITALS — WEIGHT: 209 LBS | HEIGHT: 64 IN | BODY MASS INDEX: 35.68 KG/M2

## 2024-06-19 DIAGNOSIS — Z12.31 ENCOUNTER FOR SCREENING MAMMOGRAM FOR MALIGNANT NEOPLASM OF BREAST: ICD-10-CM

## 2024-06-19 PROCEDURE — 77063 BREAST TOMOSYNTHESIS BI: CPT

## 2024-06-19 PROCEDURE — 77067 SCR MAMMO BI INCL CAD: CPT
